# Patient Record
Sex: MALE | Race: WHITE | NOT HISPANIC OR LATINO | Employment: OTHER | URBAN - METROPOLITAN AREA
[De-identification: names, ages, dates, MRNs, and addresses within clinical notes are randomized per-mention and may not be internally consistent; named-entity substitution may affect disease eponyms.]

---

## 2017-08-08 ENCOUNTER — GENERIC CONVERSION - ENCOUNTER (OUTPATIENT)
Dept: OTHER | Facility: OTHER | Age: 82
End: 2017-08-08

## 2017-08-09 ENCOUNTER — ALLSCRIPTS OFFICE VISIT (OUTPATIENT)
Dept: OTHER | Facility: OTHER | Age: 82
End: 2017-08-09

## 2017-08-15 ENCOUNTER — GENERIC CONVERSION - ENCOUNTER (OUTPATIENT)
Dept: OTHER | Facility: OTHER | Age: 82
End: 2017-08-15

## 2017-08-18 ENCOUNTER — GENERIC CONVERSION - ENCOUNTER (OUTPATIENT)
Dept: OTHER | Facility: OTHER | Age: 82
End: 2017-08-18

## 2017-08-29 ENCOUNTER — GENERIC CONVERSION - ENCOUNTER (OUTPATIENT)
Dept: OTHER | Facility: OTHER | Age: 82
End: 2017-08-29

## 2017-10-02 ENCOUNTER — GENERIC CONVERSION - ENCOUNTER (OUTPATIENT)
Dept: OTHER | Facility: OTHER | Age: 82
End: 2017-10-02

## 2017-10-03 ENCOUNTER — TRANSCRIBE ORDERS (OUTPATIENT)
Dept: ADMINISTRATIVE | Facility: HOSPITAL | Age: 82
End: 2017-10-03

## 2017-10-03 ENCOUNTER — APPOINTMENT (OUTPATIENT)
Dept: LAB | Facility: HOSPITAL | Age: 82
End: 2017-10-03
Attending: UROLOGY
Payer: COMMERCIAL

## 2017-10-03 DIAGNOSIS — C61 MALIGNANT NEOPLASM OF PROSTATE (HCC): ICD-10-CM

## 2017-10-03 DIAGNOSIS — C61 MALIGNANT NEOPLASM OF PROSTATE (HCC): Primary | ICD-10-CM

## 2017-10-03 LAB — PSA SERPL-MCNC: <0.1 NG/ML (ref 0–4)

## 2017-10-03 PROCEDURE — G0103 PSA SCREENING: HCPCS

## 2017-10-03 PROCEDURE — 36415 COLL VENOUS BLD VENIPUNCTURE: CPT

## 2017-12-28 ENCOUNTER — ANESTHESIA EVENT (OUTPATIENT)
Dept: PERIOP | Facility: HOSPITAL | Age: 82
DRG: 494 | End: 2017-12-28
Payer: COMMERCIAL

## 2017-12-29 ENCOUNTER — GENERIC CONVERSION - ENCOUNTER (OUTPATIENT)
Dept: OTHER | Facility: OTHER | Age: 82
End: 2017-12-29

## 2017-12-29 ENCOUNTER — HOSPITAL ENCOUNTER (OUTPATIENT)
Dept: RADIOLOGY | Facility: HOSPITAL | Age: 82
Setting detail: OUTPATIENT SURGERY
Discharge: HOME | DRG: 494 | End: 2017-12-29
Payer: COMMERCIAL

## 2017-12-29 ENCOUNTER — HOSPITAL ENCOUNTER (INPATIENT)
Facility: HOSPITAL | Age: 82
LOS: 1 days | Discharge: HOME HEALTH | DRG: 494 | End: 2017-12-31
Attending: PODIATRIST | Admitting: PODIATRIST
Payer: COMMERCIAL

## 2017-12-29 ENCOUNTER — ANESTHESIA (OUTPATIENT)
Dept: PERIOP | Facility: HOSPITAL | Age: 82
DRG: 494 | End: 2017-12-29
Payer: COMMERCIAL

## 2017-12-29 ENCOUNTER — APPOINTMENT (OUTPATIENT)
Dept: RADIOLOGY | Facility: HOSPITAL | Age: 82
DRG: 494 | End: 2017-12-29
Payer: COMMERCIAL

## 2017-12-29 DIAGNOSIS — S82.841A CLOSED DISPLACED BIMALLEOLAR FRACTURE OF RIGHT LOWER LEG: ICD-10-CM

## 2017-12-29 DIAGNOSIS — R26.2 AMBULATORY DYSFUNCTION: ICD-10-CM

## 2017-12-29 DIAGNOSIS — I48.20 CHRONIC ATRIAL FIBRILLATION (HCC): Primary | ICD-10-CM

## 2017-12-29 PROCEDURE — C1713 ANCHOR/SCREW BN/BN,TIS/BN: HCPCS | Performed by: PODIATRIST

## 2017-12-29 PROCEDURE — 0QSG04Z REPOSITION RIGHT TIBIA WITH INTERNAL FIXATION DEVICE, OPEN APPROACH: ICD-10-PCS | Performed by: PODIATRIST

## 2017-12-29 PROCEDURE — 73600 X-RAY EXAM OF ANKLE: CPT

## 2017-12-29 PROCEDURE — C1769 GUIDE WIRE: HCPCS | Performed by: PODIATRIST

## 2017-12-29 PROCEDURE — 73610 X-RAY EXAM OF ANKLE: CPT

## 2017-12-29 DEVICE — IMPLANTABLE DEVICE: Type: IMPLANTABLE DEVICE | Site: FIBULA | Status: FUNCTIONAL

## 2017-12-29 DEVICE — IMPLANTABLE DEVICE: Type: IMPLANTABLE DEVICE | Site: ANKLE | Status: FUNCTIONAL

## 2017-12-29 DEVICE — SCREW CORT 3.5 X 18MM LOPRFL: Type: IMPLANTABLE DEVICE | Site: FIBULA | Status: FUNCTIONAL

## 2017-12-29 DEVICE — SCREW CORT 3.5 X 16MM LOPRFL: Type: IMPLANTABLE DEVICE | Site: FIBULA | Status: FUNCTIONAL

## 2017-12-29 DEVICE — SCREW CORT 2.7 X 14MM LCK LOPROFL: Type: IMPLANTABLE DEVICE | Site: FIBULA | Status: FUNCTIONAL

## 2017-12-29 DEVICE — SCREW CORT 3.5 X 14MM LCK LOPRFL: Type: IMPLANTABLE DEVICE | Site: FIBULA | Status: FUNCTIONAL

## 2017-12-29 DEVICE — SCREW CORT 2.7 X 12MM LCK LOPRRL: Type: IMPLANTABLE DEVICE | Site: FIBULA | Status: FUNCTIONAL

## 2017-12-29 DEVICE — SCREW CORT 3.5 X 16MM LCK LOPRFL: Type: IMPLANTABLE DEVICE | Site: FIBULA | Status: FUNCTIONAL

## 2017-12-29 RX ORDER — TRAMADOL HYDROCHLORIDE 50 MG/1
50 TABLET ORAL 2 TIMES DAILY PRN
COMMUNITY

## 2017-12-29 RX ORDER — LISINOPRIL 20 MG/1
40 TABLET ORAL DAILY
Status: DISCONTINUED | OUTPATIENT
Start: 2017-12-30 | End: 2017-12-31 | Stop reason: HOSPADM

## 2017-12-29 RX ORDER — MIDAZOLAM HYDROCHLORIDE 1 MG/ML
INJECTION INTRAMUSCULAR; INTRAVENOUS AS NEEDED
Status: DISCONTINUED | OUTPATIENT
Start: 2017-12-29 | End: 2017-12-29 | Stop reason: SURG

## 2017-12-29 RX ORDER — OXYBUTYNIN CHLORIDE 15 MG/1
15 TABLET, EXTENDED RELEASE ORAL DAILY
COMMUNITY
End: 2018-11-11 | Stop reason: SDUPTHER

## 2017-12-29 RX ORDER — AMOXICILLIN 500 MG/1
500 TABLET, FILM COATED ORAL DAILY PRN
COMMUNITY

## 2017-12-29 RX ORDER — ALBUTEROL SULFATE 90 UG/1
2 AEROSOL, METERED RESPIRATORY (INHALATION) EVERY 6 HOURS PRN
Status: DISCONTINUED | OUTPATIENT
Start: 2017-12-29 | End: 2017-12-31 | Stop reason: HOSPADM

## 2017-12-29 RX ORDER — DILTIAZEM HYDROCHLORIDE 120 MG/1
120 CAPSULE, COATED, EXTENDED RELEASE ORAL DAILY
Status: DISCONTINUED | OUTPATIENT
Start: 2017-12-30 | End: 2017-12-31 | Stop reason: HOSPADM

## 2017-12-29 RX ORDER — TRAMADOL HYDROCHLORIDE 50 MG/1
50 TABLET ORAL 2 TIMES DAILY PRN
Status: DISCONTINUED | OUTPATIENT
Start: 2017-12-29 | End: 2017-12-31 | Stop reason: HOSPADM

## 2017-12-29 RX ORDER — FUROSEMIDE 20 MG/1
20 TABLET ORAL DAILY
COMMUNITY

## 2017-12-29 RX ORDER — BUDESONIDE AND FORMOTEROL FUMARATE DIHYDRATE 160; 4.5 UG/1; UG/1
2 AEROSOL RESPIRATORY (INHALATION) DAILY
Status: DISCONTINUED | OUTPATIENT
Start: 2017-12-30 | End: 2017-12-31 | Stop reason: HOSPADM

## 2017-12-29 RX ORDER — MIRTAZAPINE 15 MG/1
15 TABLET, FILM COATED ORAL
Status: DISCONTINUED | OUTPATIENT
Start: 2017-12-29 | End: 2017-12-31 | Stop reason: HOSPADM

## 2017-12-29 RX ORDER — LISINOPRIL 40 MG/1
40 TABLET ORAL DAILY
COMMUNITY

## 2017-12-29 RX ORDER — ONDANSETRON 2 MG/ML
INJECTION INTRAMUSCULAR; INTRAVENOUS AS NEEDED
Status: DISCONTINUED | OUTPATIENT
Start: 2017-12-29 | End: 2017-12-29 | Stop reason: SURG

## 2017-12-29 RX ORDER — ALBUTEROL SULFATE 90 UG/1
2 AEROSOL, METERED RESPIRATORY (INHALATION) EVERY 6 HOURS PRN
COMMUNITY

## 2017-12-29 RX ORDER — ATORVASTATIN CALCIUM 40 MG/1
40 TABLET, FILM COATED ORAL DAILY
COMMUNITY

## 2017-12-29 RX ORDER — PROPOFOL 10 MG/ML
INJECTION, EMULSION INTRAVENOUS AS NEEDED
Status: DISCONTINUED | OUTPATIENT
Start: 2017-12-29 | End: 2017-12-29 | Stop reason: SURG

## 2017-12-29 RX ORDER — ATORVASTATIN CALCIUM 40 MG/1
40 TABLET, FILM COATED ORAL DAILY
Status: DISCONTINUED | OUTPATIENT
Start: 2017-12-30 | End: 2017-12-31 | Stop reason: HOSPADM

## 2017-12-29 RX ORDER — ATENOLOL 25 MG/1
50 TABLET ORAL DAILY
COMMUNITY

## 2017-12-29 RX ORDER — FUROSEMIDE 20 MG/1
20 TABLET ORAL DAILY
Status: DISCONTINUED | OUTPATIENT
Start: 2017-12-30 | End: 2017-12-31 | Stop reason: HOSPADM

## 2017-12-29 RX ORDER — SODIUM CHLORIDE 9 MG/ML
125 INJECTION, SOLUTION INTRAVENOUS CONTINUOUS
Status: CANCELLED | OUTPATIENT
Start: 2017-12-29

## 2017-12-29 RX ORDER — OXYBUTYNIN CHLORIDE 5 MG/1
15 TABLET, EXTENDED RELEASE ORAL DAILY
Status: DISCONTINUED | OUTPATIENT
Start: 2017-12-30 | End: 2017-12-31 | Stop reason: HOSPADM

## 2017-12-29 RX ORDER — OXYCODONE HYDROCHLORIDE AND ACETAMINOPHEN 5; 325 MG/1; MG/1
1 TABLET ORAL EVERY 4 HOURS PRN
Status: DISCONTINUED | OUTPATIENT
Start: 2017-12-29 | End: 2017-12-30

## 2017-12-29 RX ORDER — DABIGATRAN ETEXILATE 150 MG/1
150 CAPSULE, COATED PELLETS ORAL EVERY 12 HOURS SCHEDULED
Status: DISCONTINUED | OUTPATIENT
Start: 2017-12-29 | End: 2017-12-31 | Stop reason: HOSPADM

## 2017-12-29 RX ORDER — DONEPEZIL HYDROCHLORIDE 10 MG/1
10 TABLET, FILM COATED ORAL DAILY
COMMUNITY

## 2017-12-29 RX ORDER — MIRTAZAPINE 30 MG/1
30 TABLET, FILM COATED ORAL
COMMUNITY

## 2017-12-29 RX ORDER — ATENOLOL 25 MG/1
25 TABLET ORAL DAILY
Status: DISCONTINUED | OUTPATIENT
Start: 2017-12-30 | End: 2017-12-31 | Stop reason: HOSPADM

## 2017-12-29 RX ORDER — DONEPEZIL HYDROCHLORIDE 5 MG/1
10 TABLET, FILM COATED ORAL DAILY
Status: DISCONTINUED | OUTPATIENT
Start: 2017-12-30 | End: 2017-12-31 | Stop reason: HOSPADM

## 2017-12-29 RX ORDER — LIDOCAINE HYDROCHLORIDE 10 MG/ML
INJECTION, SOLUTION INFILTRATION; PERINEURAL AS NEEDED
Status: DISCONTINUED | OUTPATIENT
Start: 2017-12-29 | End: 2017-12-29 | Stop reason: SURG

## 2017-12-29 RX ORDER — HYDRALAZINE HYDROCHLORIDE 20 MG/ML
5 INJECTION INTRAMUSCULAR; INTRAVENOUS EVERY 6 HOURS PRN
Status: DISCONTINUED | OUTPATIENT
Start: 2017-12-29 | End: 2017-12-30

## 2017-12-29 RX ORDER — POTASSIUM CHLORIDE 750 MG/1
10 CAPSULE, EXTENDED RELEASE ORAL 2 TIMES DAILY
Status: DISCONTINUED | OUTPATIENT
Start: 2017-12-29 | End: 2017-12-29 | Stop reason: SDUPTHER

## 2017-12-29 RX ORDER — ROPIVACAINE HYDROCHLORIDE 5 MG/ML
INJECTION, SOLUTION EPIDURAL; INFILTRATION; PERINEURAL AS NEEDED
Status: DISCONTINUED | OUTPATIENT
Start: 2017-12-29 | End: 2017-12-29 | Stop reason: SURG

## 2017-12-29 RX ORDER — FENTANYL CITRATE 50 UG/ML
INJECTION, SOLUTION INTRAMUSCULAR; INTRAVENOUS AS NEEDED
Status: DISCONTINUED | OUTPATIENT
Start: 2017-12-29 | End: 2017-12-29 | Stop reason: SURG

## 2017-12-29 RX ORDER — POTASSIUM CHLORIDE 750 MG/1
10 TABLET, EXTENDED RELEASE ORAL 2 TIMES DAILY
Status: DISCONTINUED | OUTPATIENT
Start: 2017-12-29 | End: 2017-12-31 | Stop reason: HOSPADM

## 2017-12-29 RX ORDER — DILTIAZEM HYDROCHLORIDE 60 MG/1
120 TABLET, FILM COATED ORAL DAILY
Status: DISCONTINUED | OUTPATIENT
Start: 2017-12-30 | End: 2017-12-29 | Stop reason: CLARIF

## 2017-12-29 RX ORDER — MEPERIDINE HYDROCHLORIDE 50 MG/ML
12.5 INJECTION INTRAMUSCULAR; INTRAVENOUS; SUBCUTANEOUS ONCE AS NEEDED
Status: DISCONTINUED | OUTPATIENT
Start: 2017-12-29 | End: 2017-12-29 | Stop reason: HOSPADM

## 2017-12-29 RX ORDER — FENTANYL CITRATE/PF 50 MCG/ML
50 SYRINGE (ML) INJECTION
Status: DISCONTINUED | OUTPATIENT
Start: 2017-12-29 | End: 2017-12-29 | Stop reason: HOSPADM

## 2017-12-29 RX ORDER — SODIUM CHLORIDE 9 MG/ML
75 INJECTION, SOLUTION INTRAVENOUS CONTINUOUS
Status: DISCONTINUED | OUTPATIENT
Start: 2017-12-29 | End: 2017-12-31 | Stop reason: HOSPADM

## 2017-12-29 RX ORDER — DABIGATRAN ETEXILATE 150 MG/1
150 CAPSULE, COATED PELLETS ORAL 2 TIMES DAILY
COMMUNITY
End: 2019-02-14

## 2017-12-29 RX ORDER — BUDESONIDE AND FORMOTEROL FUMARATE DIHYDRATE 160; 4.5 UG/1; UG/1
2 AEROSOL RESPIRATORY (INHALATION) DAILY
COMMUNITY
End: 2018-09-14

## 2017-12-29 RX ORDER — ONDANSETRON 2 MG/ML
4 INJECTION INTRAMUSCULAR; INTRAVENOUS ONCE AS NEEDED
Status: DISCONTINUED | OUTPATIENT
Start: 2017-12-29 | End: 2017-12-29 | Stop reason: HOSPADM

## 2017-12-29 RX ORDER — MULTIVITAMIN
1 CAPSULE ORAL DAILY
COMMUNITY

## 2017-12-29 RX ORDER — POTASSIUM CHLORIDE 750 MG/1
10 CAPSULE, EXTENDED RELEASE ORAL 2 TIMES DAILY
COMMUNITY
End: 2022-02-09 | Stop reason: ALTCHOICE

## 2017-12-29 RX ORDER — DILTIAZEM HYDROCHLORIDE 120 MG/1
120 TABLET, FILM COATED ORAL DAILY
COMMUNITY
End: 2022-02-09 | Stop reason: ALTCHOICE

## 2017-12-29 RX ORDER — MAGNESIUM HYDROXIDE 1200 MG/15ML
LIQUID ORAL AS NEEDED
Status: DISCONTINUED | OUTPATIENT
Start: 2017-12-29 | End: 2017-12-29 | Stop reason: HOSPADM

## 2017-12-29 RX ORDER — BUPIVACAINE HYDROCHLORIDE 5 MG/ML
INJECTION, SOLUTION PERINEURAL AS NEEDED
Status: DISCONTINUED | OUTPATIENT
Start: 2017-12-29 | End: 2017-12-29 | Stop reason: HOSPADM

## 2017-12-29 RX ADMIN — DEXAMETHASONE SODIUM PHOSPHATE 4 MG: 10 INJECTION INTRAMUSCULAR; INTRAVENOUS at 15:53

## 2017-12-29 RX ADMIN — PROPOFOL 150 MG: 10 INJECTION, EMULSION INTRAVENOUS at 13:37

## 2017-12-29 RX ADMIN — HYDROMORPHONE HYDROCHLORIDE 0.5 MG: 1 INJECTION, SOLUTION INTRAMUSCULAR; INTRAVENOUS; SUBCUTANEOUS at 17:05

## 2017-12-29 RX ADMIN — MIDAZOLAM HYDROCHLORIDE 1 MG: 1 INJECTION, SOLUTION INTRAMUSCULAR; INTRAVENOUS at 13:09

## 2017-12-29 RX ADMIN — TRAMADOL HYDROCHLORIDE 50 MG: 50 TABLET, FILM COATED ORAL at 22:58

## 2017-12-29 RX ADMIN — ROPIVACAINE HYDROCHLORIDE 25 ML: 5 INJECTION, SOLUTION EPIDURAL; INFILTRATION; PERINEURAL at 13:17

## 2017-12-29 RX ADMIN — FENTANYL CITRATE 100 MCG: 50 INJECTION INTRAMUSCULAR; INTRAVENOUS at 13:09

## 2017-12-29 RX ADMIN — MIRTAZAPINE 15 MG: 15 TABLET, FILM COATED ORAL at 21:17

## 2017-12-29 RX ADMIN — SODIUM CHLORIDE 125 ML/HR: 0.9 INJECTION, SOLUTION INTRAVENOUS at 11:34

## 2017-12-29 RX ADMIN — FENTANYL CITRATE 50 MCG: 50 INJECTION INTRAMUSCULAR; INTRAVENOUS at 16:42

## 2017-12-29 RX ADMIN — SODIUM CHLORIDE 125 ML/HR: 0.9 INJECTION, SOLUTION INTRAVENOUS at 23:22

## 2017-12-29 RX ADMIN — LIDOCAINE HYDROCHLORIDE 60 MG: 10 INJECTION, SOLUTION INFILTRATION; PERINEURAL at 13:37

## 2017-12-29 RX ADMIN — HYDROMORPHONE HYDROCHLORIDE 0.5 MG: 1 INJECTION, SOLUTION INTRAMUSCULAR; INTRAVENOUS; SUBCUTANEOUS at 16:56

## 2017-12-29 RX ADMIN — ROPIVACAINE HYDROCHLORIDE 15 ML: 5 INJECTION, SOLUTION EPIDURAL; INFILTRATION; PERINEURAL at 13:22

## 2017-12-29 RX ADMIN — DABIGATRAN ETEXILATE MESYLATE 150 MG: 150 CAPSULE ORAL at 21:17

## 2017-12-29 RX ADMIN — POTASSIUM CHLORIDE 10 MEQ: 750 TABLET, EXTENDED RELEASE ORAL at 20:04

## 2017-12-29 RX ADMIN — MIDAZOLAM HYDROCHLORIDE 1 MG: 1 INJECTION, SOLUTION INTRAMUSCULAR; INTRAVENOUS at 13:55

## 2017-12-29 RX ADMIN — FENTANYL CITRATE 50 MCG: 50 INJECTION INTRAMUSCULAR; INTRAVENOUS at 16:37

## 2017-12-29 RX ADMIN — ONDANSETRON HYDROCHLORIDE 4 MG: 2 INJECTION, SOLUTION INTRAVENOUS at 15:53

## 2017-12-29 RX ADMIN — FENTANYL CITRATE 50 MCG: 50 INJECTION INTRAMUSCULAR; INTRAVENOUS at 15:05

## 2017-12-29 RX ADMIN — SODIUM CHLORIDE 125 ML/HR: 0.9 INJECTION, SOLUTION INTRAVENOUS at 16:16

## 2017-12-29 RX ADMIN — FENTANYL CITRATE 100 MCG: 50 INJECTION INTRAMUSCULAR; INTRAVENOUS at 13:59

## 2017-12-29 RX ADMIN — OXYCODONE HYDROCHLORIDE AND ACETAMINOPHEN 1 TABLET: 5; 325 TABLET ORAL at 21:18

## 2017-12-29 NOTE — H&P
Patient is an 80-year-old male status post ORIF bimalleolar fracture of the right ankle who was admitted postoperatively for pain control and ambulatory dysfunction  We will consult PT and OT for recommendations regarding his non weight-bearing status  We will also consult internal medicine as patient's wife is concerned with his history of atrial fibrillation and since he has been off blood thinners she is concerned that he will throw clots  We will restart his anticoagulation of Pradaxa tonight  Patient past medical history significant for rheumatoid arthritis, history of premature atrial contraction, atrial fibrillation hypertension, hyperlipidemia, history of CVA and COPD  The patient was medically cleared for surgery from primary care provider    Full H and P in patient's chart to be scanned please refer to this for complete history and physical

## 2017-12-29 NOTE — ANESTHESIA PREPROCEDURE EVALUATION
Review of Systems/Medical History  Patient summary reviewed  Chart reviewed  No history of anesthetic complications     Cardiovascular  Hyperlipidemia, Hypertension controlled, Dysrhythmias, atrial fibrillation,    Pulmonary  Smoker ex-smoker , COPD , Sleep apnea , ,        GI/Hepatic  Negative GI/hepatic ROS          Prostatic disorder, history of prostate cancer       Endo/Other  Arthritis     GYN       Hematology    Coagulation disorder (LD Pradaxa 2 days ago) currently taking oral anticoagulants,    Musculoskeletal  Rheumatoid arthritis Severity: moderate, Back pain , chronic back pain and lumbar pain,        Neurology    CVA ("Questionable") , no residual symptoms,   Comment: Mild dementia Psychology   Negative psychology ROS            Physical Exam    Airway    Mallampati score: II  TM Distance: >3 FB  Neck ROM: full     Dental   Comment: "Dental appliance" on bottom,     Cardiovascular  Rhythm: irregular, Rate: normal, Murmur,     Pulmonary  Pulmonary exam normal Breath sounds clear to auscultation,     Other Findings        Anesthesia Plan  ASA Score- 3     Anesthesia Type- general and regional with ASA Monitors  Additional Monitors:   Airway Plan: ETT  Comment: Popliteal /saphenous block preop  Plan Factors-Patient not instructed to abstain from smoking on day of procedure  Patient did not smoke on day of surgery  Induction- intravenous  Postoperative Plan- Plan for postoperative opioid use  Informed Consent- Anesthetic plan and risks discussed with patient and spouse

## 2017-12-29 NOTE — ANESTHESIA PROCEDURE NOTES
Peripheral Block    Patient location during procedure: OR  Start time: 12/29/2017 1:18 PM  End time: 12/29/2017 1:22 PM  Reason for block: at surgeon's request and post-op pain management  Staffing  Anesthesiologist: Arely Ibrahim  Performed: anesthesiologist   Preanesthetic Checklist  Completed: patient identified, site marked, surgical consent, pre-op evaluation, timeout performed, IV checked, risks and benefits discussed and monitors and equipment checked  Peripheral Block  Patient position: supine  Prep: ChloraPrep  Patient monitoring: heart rate, continuous pulse ox and frequent blood pressure checks  Block type: adductor canal block  Laterality: right  Injection technique: single-shot  Procedures: ultrasound guided  ultrasound permanent image saved    Local infiltration: ropivacaine  Infiltration strength: 0 5 %  Dose: 15 mL  Needle  Needle type: short-bevel   Needle length: 10 cm  Needle localization: ultrasound guidance  Test dose: negative  Assessment  Injection assessment: incremental injection, local visualized surrounding nerve on ultrasound, negative aspiration for heme and no paresthesia on injection  patient tolerated the procedure well with no immediate complications

## 2017-12-29 NOTE — ANESTHESIA PROCEDURE NOTES
Peripheral Block    Patient location during procedure: OR  Start time: 12/29/2017 1:09 PM  End time: 12/29/2017 1:17 PM  Reason for block: at surgeon's request and post-op pain management  Staffing  Anesthesiologist: Placido Harper  Performed: anesthesiologist   Preanesthetic Checklist  Completed: patient identified, site marked, surgical consent, pre-op evaluation, timeout performed, IV checked, risks and benefits discussed and monitors and equipment checked  Peripheral Block  Patient position: left lateral decubitus  Prep: ChloraPrep  Patient monitoring: heart rate, continuous pulse ox and frequent blood pressure checks  Block type: popliteal  Laterality: right  Injection technique: single-shot  Procedures: ultrasound guided and nerve stimulator  ultrasound permanent image saved    Local infiltration: ropivacaine  Infiltration strength: 0 5 %  Dose: 25 mL  Needle  Needle type: short-bevel   Needle gauge: 22 G  Needle length: 10 cm  Needle localization: ultrasound guidance  Assessment  Injection assessment: incremental injection, local visualized surrounding nerve on ultrasound and negative aspiration for heme  Post-procedure:  site cleaned  patient tolerated the procedure well with no immediate complications

## 2017-12-29 NOTE — OP NOTE
OPERATIVE REPORT  PATIENT NAME: Masha Mantilla    :  1935  MRN: [id number]  Pt Location: [facility] OR ROOM 01    SURGERY DATE: 2017    Surgeon(s) and Role:     * Roselia Washington DPM - Primary     * Chaim Montanez - Assisting    Preop Diagnosis:  Closed displaced bimalleolar fracture of right lower leg [S82 841A]    Post-Op Diagnosis Codes:     * Closed displaced bimalleolar fracture of right lower leg [S82 841A]    Procedure(s) (LRB):  FIBULA, MEDIAL MALLEOLUS FRACTUE OPEN REDUCTION W/ INTERNAL FIXATION; USE OF PLATES AND SCREWS (Right)    Specimen(s):  * No specimens in log *    Estimated Blood Loss:   Minimal    Drains:  Urethral Catheter Latex 16 Fr  (Active)   Site Assessment Clean;Skin intact 2017  2:11 PM   Collection Container Leg bag 2017  2:11 PM   Securement Method Leg strap 2017  2:11 PM   Output (mL) 150 mL 2017  2:11 PM   Number of days: 0       Anesthesia Type:   General w/ Popliteal Block    Hemostasis:  Pneumatic thigh tourniquet at 300 mm of mercury    Operative Indications:  Closed displaced bimalleolar fracture of right lower leg [S82 841A]    Materials  Arthrex locking distal fibular plate with 2 7 and 3 5 locking screws  One 3 5 x 18 mm nonlocking fully-threaded lag screw  Arthrex locking medial hook plate with 2 7 and 3 5 locking and nonlocking screw  Arthrex 4 0 cannulated partially threaded screw  Vicryl, Monocryl, surgical staples    Injectables:  10 cc of 0 5% Marcaine plain      Operative Findings:  Bimalleolar fracture with displaced medial malleolar fracture and fibular fragment    Complications:   None    Procedure and Technique:  Under mild sedation, the patient was brought into the operating room and placed on the operating room table in the supine position  A pneumatic thigh tourniquet was then placed around the patient's right thigh with ample webril padding   A time out was performed to confirm the correct patient, procedure and site with all parties in agreement  Preoperatively, patient received a popliteal block of the right lower extremity  Following general sedation, the foot was then scrubbed, prepped and draped in the usual aseptic manner  An esmarch bandage was utilized to exsangunate the patients foot and the pneumatic thigh tourniquet was then inflated  The esmarch bandage was removed and the foot was placed on the operating room table  Attention was then directed to the lateral aspect of the patient's right leg where approximately a 7 cm to 8 cm linear incision was made overlying the patient's fibula  The incision was deepened down in a layered fashion through skin and subcutaneous layers, superficial and deep fascia and periosteum utilizing a combination of sharp and blunt dissection  Care was taken to retract all vital neurovascular structures  All bleeders were cauterized and ligated as necessary  Once down to the level of the periosteum, an incision was then made overlying the periosteum of the fibula and the periosteum was reflected off the fibula utilizing a key elevator  Once this was accomplished, the oblique fracture of the fibula was visualized and consistent with the previous x-rays  Utilizing a curette and irrigation with normal sterile saline, the fracture site was debrided of any fibrous clot that had formed or small bone fragments that were interposed between the main fibula pieces  Once the fracture site was essentially cleared out, the fracture was temporarily reduced utilizing bone reduction forceps  At this point, clinically, the fracture was reduced and compressed  Intraoperative fluoroscopy was also used to visualize the fibula and excellent alignment of the fracture site with restoration of the length of the fibula and reduction of any angulation or rotation was noted       At this point, a 3 5 mm fully threaded cortical screw was placed from anterior to posterior at the angle of 45 degrees to the fracture site through the fracture line  This was done utilizing standard AO lag screw technique and the screw was tightened down  Excellent compression was noted at the fracture site and the alignment and length of the fibula was noted to still be in excellent position and verified utilizing the intraoperative fluoroscopy  At this time a 7 hole plate 1/3 tubular plate was placed overlying the lateral aspect of the fibula and secured using combination of 3 5 and 2 7 locking screws  Care was taken to contour the plate to the natural contour of the fibula and also to direct the distal cancellous screws in a slight proximal angulation to avoid entering the ankle joint  Intraoperative fluoroscopy was used throughout this process and was checked again after all screws and plate was applied  Screws were deemed proper length and plate was fairly adhered to the fibula  At this time a hook was then applied to the distal fibula and using live fluoroscopy the syndesmosis was stressed  This was negative for any syndesmotic instability and therefore no syndesmotic fixation was necessary  The area was then copiously flushed with normal sterile saline and closed in a layered fashion utilizing 2-0 Vicryl, 3-0 monocryl  and stainless steel surgical staples  Attention was then directed to the medial aspect of the patient's ankle where previous x-ray analysis showed a transverse avulsion fracture of the medial malleolus  Incision was then made approximately 4-5 cm in length linear extending from superior to inferior overlying the midline of the medial aspect of the distal tibia, extending from just distal to the medial malleolus and proximally from there  This incision was deepened down in a layered fashion  Care was taken to retract all vital neurovascular structures  All bleeders were cauterized or ligated as necessary  Upon visualization of the medial aspect of the patient's ankle, the fracture as noted in the x-ray was visualized intraoperatively  The fracture site of the medial malleolus was then curetted of any blood or fibrous clot that had formed since the fracture to allow for  interposition of the fracture fragments  Once the curetting was finished, the fracture fragments were realigned utilizing bone reduction forceps  Excellent alignment and compression of the fracture site was noted both clinically and also utilizing the intraoperative fluoroscopy  Attention was specifically noted to the ankle joint where no intra-articular stepoff was noted  Next, utilizing a K-wire , a 8 191 K-wires was driven from distal to proximal slight oblique angle, anterior and posterior and parallel to each other up from the distal medial malleolus into the mid medullary canal of the distal shaft of the tibia  Intraoperative fluoroscopy was used in multiple planes and noted adequate position of these K-wires  Next, having the periosteum reflected off the distal medial aspect of the patient's tibia proximal to the fracture site, an Arthrex locking medial hook plate was applied to the area and secured with one 4 0 partially threaded cannulated screw from the distal aspect and a combination of 2 7 and 3 5 mm locking and non-locking screws  Screw and plate fixation was visualized using C-arm fluoroscopy and noted to be in adequate alignment  Once all the fixation was achieved, the wound was then copiously irrigated with normal sterile saline and closed in layered fashion utilizing 2-0 Vicryl, 3-0 monocryl and surgical stainless steel staples  Final intraoperative fluoroscopy was then undertaken and excellent alignment of the ankle joint with compression of both medial and lateral fracture sites and alignment of the fracture fragment was noted  A postop injection consisting of 10 cc of 0 5% Marcaine plain was then injected  Dressings were then applied consisting of xeroform, 4 x 4s,  And Kerlix    The tourniquet was deflated and prompt capillary response was noted to the lower extremity  Next, a multi-layer dressing consisting of cast padding and Ace bandage were placed about to the patient's leg distal to the patient's knee  Next a below-knee fiberglass cast was then placed with the ankle in neutral alignment  The patient tolerated the procedure and anesthesia well and was then transferred to PACU with vital signs stable              Patient Disposition:  PACU     SIGNATURE: Michael Cisse  DATE: December 29, 2017  TIME: 4:12 PM

## 2017-12-30 PROBLEM — R26.2 AMBULATORY DYSFUNCTION: Status: ACTIVE | Noted: 2017-12-30

## 2017-12-30 LAB
APAP SERPL-MCNC: <2 UG/ML (ref 10–30)
HCT VFR BLD AUTO: 41.2 % (ref 36.5–49.3)
HGB BLD-MCNC: 13.5 G/DL (ref 12–17)

## 2017-12-30 PROCEDURE — G0480 DRUG TEST DEF 1-7 CLASSES: HCPCS | Performed by: HOSPITALIST

## 2017-12-30 PROCEDURE — 85014 HEMATOCRIT: CPT | Performed by: HOSPITALIST

## 2017-12-30 PROCEDURE — 94660 CPAP INITIATION&MGMT: CPT

## 2017-12-30 PROCEDURE — 85018 HEMOGLOBIN: CPT | Performed by: HOSPITALIST

## 2017-12-30 PROCEDURE — 80329 ANALGESICS NON-OPIOID 1 OR 2: CPT | Performed by: HOSPITALIST

## 2017-12-30 RX ORDER — OXYCODONE HYDROCHLORIDE 10 MG/1
10 TABLET ORAL EVERY 6 HOURS PRN
Status: DISCONTINUED | OUTPATIENT
Start: 2017-12-30 | End: 2017-12-31 | Stop reason: HOSPADM

## 2017-12-30 RX ORDER — OXYCODONE HYDROCHLORIDE AND ACETAMINOPHEN 5; 325 MG/1; MG/1
1 TABLET ORAL EVERY 4 HOURS PRN
Qty: 30 TABLET | Refills: 0 | Status: SHIPPED | OUTPATIENT
Start: 2017-12-30 | End: 2018-01-09

## 2017-12-30 RX ORDER — HYDRALAZINE HYDROCHLORIDE 20 MG/ML
10 INJECTION INTRAMUSCULAR; INTRAVENOUS EVERY 6 HOURS PRN
Status: DISCONTINUED | OUTPATIENT
Start: 2017-12-30 | End: 2017-12-31 | Stop reason: HOSPADM

## 2017-12-30 RX ORDER — OXYCODONE HYDROCHLORIDE 5 MG/1
5 TABLET ORAL EVERY 4 HOURS PRN
Status: DISCONTINUED | OUTPATIENT
Start: 2017-12-30 | End: 2017-12-31 | Stop reason: HOSPADM

## 2017-12-30 RX ADMIN — DILTIAZEM HYDROCHLORIDE 120 MG: 120 CAPSULE, COATED, EXTENDED RELEASE ORAL at 09:06

## 2017-12-30 RX ADMIN — SODIUM CHLORIDE 125 ML/HR: 0.9 INJECTION, SOLUTION INTRAVENOUS at 06:08

## 2017-12-30 RX ADMIN — ATENOLOL 25 MG: 25 TABLET ORAL at 09:06

## 2017-12-30 RX ADMIN — OXYCODONE HYDROCHLORIDE AND ACETAMINOPHEN 1 TABLET: 5; 325 TABLET ORAL at 15:33

## 2017-12-30 RX ADMIN — LISINOPRIL 40 MG: 20 TABLET ORAL at 09:05

## 2017-12-30 RX ADMIN — POTASSIUM CHLORIDE 10 MEQ: 750 TABLET, EXTENDED RELEASE ORAL at 18:02

## 2017-12-30 RX ADMIN — DABIGATRAN ETEXILATE MESYLATE 150 MG: 150 CAPSULE ORAL at 21:15

## 2017-12-30 RX ADMIN — HYDRALAZINE HYDROCHLORIDE 5 MG: 20 INJECTION INTRAMUSCULAR; INTRAVENOUS at 15:41

## 2017-12-30 RX ADMIN — OXYCODONE HYDROCHLORIDE AND ACETAMINOPHEN 1 TABLET: 5; 325 TABLET ORAL at 08:03

## 2017-12-30 RX ADMIN — FUROSEMIDE 20 MG: 20 TABLET ORAL at 09:06

## 2017-12-30 RX ADMIN — OXYBUTYNIN CHLORIDE 15 MG: 5 TABLET, FILM COATED, EXTENDED RELEASE ORAL at 09:05

## 2017-12-30 RX ADMIN — ATORVASTATIN CALCIUM 40 MG: 40 TABLET, FILM COATED ORAL at 09:06

## 2017-12-30 RX ADMIN — MIRTAZAPINE 15 MG: 15 TABLET, FILM COATED ORAL at 21:15

## 2017-12-30 RX ADMIN — OXYCODONE HYDROCHLORIDE AND ACETAMINOPHEN 1 TABLET: 5; 325 TABLET ORAL at 02:27

## 2017-12-30 RX ADMIN — HYDRALAZINE HYDROCHLORIDE 10 MG: 20 INJECTION INTRAMUSCULAR; INTRAVENOUS at 23:50

## 2017-12-30 RX ADMIN — BUDESONIDE AND FORMOTEROL FUMARATE DIHYDRATE 2 PUFF: 160; 4.5 AEROSOL RESPIRATORY (INHALATION) at 09:07

## 2017-12-30 RX ADMIN — OXYCODONE HYDROCHLORIDE 10 MG: 10 TABLET ORAL at 20:29

## 2017-12-30 RX ADMIN — SODIUM CHLORIDE 125 ML/HR: 0.9 INJECTION, SOLUTION INTRAVENOUS at 13:57

## 2017-12-30 RX ADMIN — POTASSIUM CHLORIDE 10 MEQ: 750 TABLET, EXTENDED RELEASE ORAL at 09:05

## 2017-12-30 RX ADMIN — TRAMADOL HYDROCHLORIDE 50 MG: 50 TABLET, FILM COATED ORAL at 13:55

## 2017-12-30 RX ADMIN — HYDRALAZINE HYDROCHLORIDE 5 MG: 20 INJECTION INTRAMUSCULAR; INTRAVENOUS at 08:04

## 2017-12-30 RX ADMIN — DONEPEZIL HYDROCHLORIDE 10 MG: 5 TABLET, FILM COATED ORAL at 09:05

## 2017-12-30 RX ADMIN — DABIGATRAN ETEXILATE MESYLATE 150 MG: 150 CAPSULE ORAL at 09:05

## 2017-12-30 NOTE — DISCHARGE SUMMARY
Discharge Summary - Charline Sullivan 80 y o  male MRN: [id number]    Unit/Bed#: E5 -01 Encounter: [id number]    Admission Date: 12/29/2017     Admitting Diagnosis: Closed displaced bimalleolar fracture of right lower leg [S82 841A]    HPI: Patient is an 68-year-old male status post ORIF bimalleolar fracture of the right ankle who was admitted postoperatively for pain control and ambulatory dysfunction  We will consult PT and OT for recommendations regarding his non weight-bearing status  We will also consult internal medicine as patient's wife is concerned with his history of atrial fibrillation and since he has been off blood thinners she is concerned that he will throw clots  We will restart his anticoagulation of Pradaxa tonight      Patient past medical history significant for rheumatoid arthritis, history of premature atrial contraction, atrial fibrillation hypertension, hyperlipidemia, history of CVA and COPD  Procedures Performed: No orders of the defined types were placed in this encounter  Hospital Course: Charline Sullivan is a 80year old male who was admitted under observation for ambulatory dysfunction s/p ORIF for right bimalleolar ankle fracture  Patient was evaluated by PT and Internal Medicine, where internal medicine recommended staying in-patient for one more night to monitor patient's fever and high blood pressure  Pt was discharged upon being cleared by internal medicine and physical therapy  Patient's neurovascular status and gross motor function was within baseline as patient had brisk cap refill time to right toes and was able to wiggle toes without difficulty or pain   Pt is to be discharged nonweightbearing to right lower extremity with knee roller/walker in below knee fiberglass cast  Patient will be discharged home with home PT    Significant Findings, Care, Treatment and Services Provided:   Post-operative xrays: expected post-operative changes s/p ORIF for bimalleolar ankle fracture  Consultations: PT and Internal Medicine     Complications: none    Discharge Diagnosis: s/p ORIF of right ankle bimalleolar fracture     Condition at Discharge: good     Discharge instructions/Information to patient and family:   See after visit summary for information provided to patient and family  Provisions for Follow-Up Care:  See after visit summary for information related to follow-up care and any pertinent home health orders  Disposition: Home with home PT    Planned Readmission: No    Discharge Statement   I spent 30 minutes discharging the patient  This time was spent on the day of discharge  I had direct contact with the patient on the day of discharge  Additional documentation is required if more than 30 minutes were spent on discharge  Discharge Medications:  See after visit summary for reconciled discharge medications provided to patient and family

## 2017-12-30 NOTE — DISCHARGE INSTRUCTIONS
Dr [facility]  1)  Cold Pack: Apply a cold pack for 45 - Minutes intervals just above the surgical site for the initial 24-48 hours  Never place on the toes  If a cast has been applied  Apply the cold pack to the thigh or knee area  2)  Elevation and Rest: Keep the foot elevated at least as high as the hips for the first 24-48 hours  It would be beneficial for the foot to be elevated when you are sitting during the first 7-10 days  Elevating the foot above the heart level will help control post operative pain and swelling  3)  Medication: Take prescription as prescribed by your physician  If you have any difficulty or side effects with the medication, stop taking immediately and notify your physician at once  Medications given today:     Percocet    3a  if applicable you may be given one of the following for prevention of blood clots  O Continue with  Pradaxa    Protection of Surgical Site/ Assistive Devices:  a) You will be given one of the following:  O below knee cast and nonweightbearing in knee roller  4) DO NOT GET THE BADAGE WET UNTIL THE DOCTOR GIVES PERMISSION  Keep the bandage clean, dry and intact until your follow-up appointment  General Information  1)  Bleeding: some bleeding through the bandage is normal  If bleeding persists, you may attempt to reinforce the existing bandage while following the above instruction  If bleeding persists, notify the physician  2)  Temperature: if your temperature rises [id number] 5 degree, call the office   3)  Problem: If you notice increasing swelling and/ or pain 2 to 3 days following surgery , please notify  4)  Pain: Within the first 24 hours following surgery, if your pain is not controlled sufficiently with pain medication, please check that your bandage is not too tight  You may loosen the badge without removing it  Wait 30 minutes, if your pain is not relieved     Please call the office

## 2017-12-30 NOTE — PROGRESS NOTES
During assessment, asked pt to wiggle toes  Pt stated that he is unable to wiggle them now  As per pt and previous nurse, pt was able to wiggle toes previously  Paged podiatrist on call  Podiatrist stated to monitor for increased swelling and increased pain  No increase in swelling noted and pt denied increase in pain

## 2017-12-30 NOTE — CONSULTS
Consulted for Medical Management by:  Dr Leanna Castillo      Assessment/Plan     1-POD#1-ORIF for bimalleolar fracture of the right ankle  Patient complaining of pain over the right ankle and asking for increase in his pain medications  2-low-grade fever likely postop secondary to surgery will continue to monitor  Continue Tylenol    3-atrial fibrillation  Rate controlled patient's Pradaxa has been started from last night  Continue 150 mg twice a day  Continue atenolol 25 mg daily    4-essential hypertension-currently uncontrolled secondary to pain will continue him on his lisinopril 40 mg daily, atenolol 25 mg daily and on hydralazine 10 mg q 6 hours p r n  for SBP more than 160     5-COPD without exacerbation  Continue Symbicort and albuterol p r n  Lisseth Washington 6-impaired fasting glucose-currently stable  His glycosylated hemoglobin has remained stable  Will continue to monitor and add a sliding scale coverage if it is elevated beyond 150     7-mild cognitive decline  Likely secondary to vascular dementia  --this has been stable over the past 6 months  Would recommend that the patient stay tonight for monitoring of his fever as well as blood pressures status if stable could be discharged tomorrow  History of Present Illness     HPI:  Jarrell Collet is a 80 y o  male who underwent ORIF of a bimalleolar fracture of the right ankle on 12/29/2017  Postoperatively he was admitted form and pain control as well as blood pressure control  His past medical history includes hypertension, dyslipidemia, COPD, cognitive decline, impaired fasting glucose The patient has been on Pradaxa for atrial fibrillation and this was held preoperatively  This was appropriately started 12 hours after surgery  His heart rate is between 82 and 90  The patient is running a low-grade fever and complains of pain over the leg  His blood pressure is elevated secondary to pain  He denies any chest pain palpitations or shortness of breath  Although there is a mention of rheumatoid arthritis in his past medical history is wife denies that the patient had any rheumatoid arthritis in the past   June Plume was consulted for medical management  He had an echocardiogram in September which revealed normal LV function, biatrial enlargement, mild aortic stenosis mild AI and mild MR  Unchanged from prior echo  His COPD has been stable and he uses Symbicort q h s  he uses albuterol MDI ray early  He was felt to have vascular dementia-by neurologist Dr Gwyn Ramirez  The patient continued on added 7  His memory has been stable over the past 6 months  He was cleared for surgery by his primary care physician  Historical Information   Past Medical History:   Diagnosis Date    [facility]fib [facility] [address] )     [facility]     CPAP (continuous positive airway pressure) dependence     CVA (cerebral vascular accident) ([facility] [address] )     possible but unsure when it was, was seen on MRI    Hyperlipidemia     Hypertension     PAC (premature atrial contraction)          Sleep apnea      There is no problem list on file for this patient  Past Surgical History:   Procedure Laterality Date    FRACTURE SURGERY      left femur    JOINT REPLACEMENT      shoulder left    PROSTATECTOMY         Social History   History   Alcohol Use No     History   Drug Use No     History   Smoking Status    Former Smoker   Smokeless Tobacco    Never Used     Comment: stopped 30 years ago        Family History:  Disease   Positive for hypertension and coronary artery  Meds/Allergies       Current Facility-Administered Medications:     albuterol (PROVENTIL HFA,VENTOLIN HFA) inhaler 2 puff, 2 puff, Inhalation, Q6H PRN, Zoë Scruggs    atenolol (TENORMIN) tablet 25 mg, 25 mg, Oral, Daily, Zoë Scruggs, 25 mg at 12/30/17 0906    atorvastatin (LIPITOR) tablet 40 mg, 40 mg, Oral, Daily, Zoë Scruggs, 40 mg at 12/30/17 0906    budesonide-formoterol (SYMBICORT) 160-4 5 mcg/act inhaler 2 puff, 2 puff, Inhalation, Daily, Trini Bottom, 2 puff at 12/30/17 0907    dabigatran etexilate (PRADAXA) capsule 150 mg, 150 mg, Oral, Q12H [address], Zoë Grahn, 150 mg at 12/30/17 0905    diltiazem (CARDIZEM CD) 24 hr capsule 120 mg, 120 mg, Oral, Daily, Zoë Grahn, 120 mg at 12/30/17 0906    donepezil (ARICEPT) tablet 10 mg, 10 mg, Oral, Daily, Zoë Grahn, 10 mg at 12/30/17 0905    furosemide (LASIX) tablet 20 mg, 20 mg, Oral, Daily, Zoë Grahn, 20 mg at 12/30/17 0906    hydrALAZINE (APRESOLINE) injection 5 mg, 5 mg, Intravenous, Q6H PRN, Valerie Hinojosa, DPM, 5 mg at 12/30/17 1541    lisinopril (ZESTRIL) tablet 40 mg, 40 mg, Oral, Daily, Zoë Grahn, 40 mg at 12/30/17 0905    mirtazapine (REMERON) tablet 15 mg, 15 mg, Oral, HS, Zoë Grahn, 15 mg at 12/29/17 2117    oxybutynin (DITROPAN-XL) 24 hr tablet 15 mg, 15 mg, Oral, Daily, Zoë Grahn, 15 mg at 12/30/17 0905    oxyCODONE-acetaminophen (PERCOCET) 5-325 mg per tablet 1 tablet, 1 tablet, Oral, Q4H PRN, Trini Bottom, 1 tablet at 12/30/17 1533    potassium chloride (K-DUR,KLOR-CON) CR tablet 10 mEq, 10 mEq, Oral, BID, Zoë Grahn, 10 mEq at 12/30/17 0905    sodium chloride 0 9 % infusion, 125 mL/hr, Intravenous, Continuous, Preston Galeano DO, Last Rate: 125 mL/hr at 12/30/17 1357, 125 mL/hr at 12/30/17 1357    traMADol (ULTRAM) tablet 50 mg, 50 mg, Oral, BID PRN, Trini Bottom, 50 mg at 12/30/17 1355    No Known Allergies    Review of Systems  A detailed 12 point review of systems was conducted and is negative apart from those mentioned in the HPI  Objective   Vitals: Blood pressure (!) 174/100, pulse 93, temperature 100 °F (37 8 °C), temperature source Temporal, resp  rate 19, height 5' 10" (1 778 m), weight 90 7 kg (200 lb), SpO2 94 %  Physical Exam   HEENT: PERRLA, EOMI, sclera anicteric, dry mucous membranes, tongue mucosa dry without lesions  Neck: supple, no JVD, lymphadenopathy, thyromegaly    Heart: Regular rate and rhythm, S1S2 present  No murmur, rub or gallop  Lungs; Clear to auscultation bilaterally  No wheezing, crackles or rhonchi  No accessory muscle use or respiratory distress  Abdomen: soft, non-tender, non-distended, NABS  No guarding or rebound  No peritoneal sound or mass  Extremities:  Leg in castNeurologic:  Cranial nerves II-XII intact  Strength and sensation globally intact  Speech fluent and goal directed  Awake, alert and oriented x 3  Skin: warm and dry  No petechiae, purpura or rash  Lab Results:   None available currently they will be reviewed once available  EKG-AFib with rapid ventricular rate  Code Status:  Full code    Counseling / Coordination of Care  Total floor / unit time spent today 22 minutes  Greater than 50% of total time was spent with the patient and / or family counseling and / or coordination of care

## 2017-12-30 NOTE — PROGRESS NOTES
Progress Note - Podiatry  Ellen Kinsey 80 y o  male MRN: [id number]  Unit/Bed#: E5 -01 Encounter: [id number]    Assessment:  3  79 y/o male pt with ambulatory dysfunction s/p ORIF bimalleolar fracture of right ankle  2  RA  3  H/o premature atrial contraction  4  afib  5  HTN  6  HLD  7  H/o CVA  8  COPD    Plan:  -Pt seen and examined at bedside  -Pt currently in pain in his RLE and has asked nurse for pain meds  Per nurse, percocet has helped with the pain in the morning  - pt is able to wiggle toes  CFT <3 secs to all digits of right foot  - postop xrays reviewed: read pending but post-operative changes s/p ORIF  - PT/OT evaluation pending  - Internal medicine consult pending, appreciate input  - pt to continue home meds      >dispo: discharge hopefully later today pending PT/OT recs and Internal Medicine evaluation    Subjective/Objective   Chief Complaint: No chief complaint on file  Subjective: 80 y o   male was seen and evaluated at bedside  Pt is laying comfortably in his bed with right leg elevated on pillow  Pt currently complains of pain in his RLE  Blood pressure (!) 182/102, pulse 95, temperature 99 1 °F (37 3 °C), temperature source Tympanic, resp  rate 18, height 5' 10" (1 778 m), weight 90 7 kg (200 lb), SpO2 95 %  ,Body mass index is 28 7 kg/m²  Invasive Devices     Peripheral Intravenous Line            Peripheral IV 12/29/17 Left Hand 1 day          Drain            Urethral Catheter Latex 16 Fr  1 day                Physical Exam:   General: Alert, cooperative and no distress  Lungs: Non labored breathing  Heart: Positive S1, S2  Abdomen: Soft, non-tender  Extremity: MSK function grossly intact, patient able to wiggle toes  CFT to right digits <3 secs  BK fiberglass cast to right foot intact        Lab, Imaging and other studies:   I have personally reviewed pertinent lab results        Imaging: I have personally reviewed pertinent films in PACS  EKG, Pathology, and Other Studies: I have personally reviewed pertinent reports    VTE Pharmacologic Prophylaxis: pradaxa

## 2017-12-31 VITALS
BODY MASS INDEX: 28.63 KG/M2 | SYSTOLIC BLOOD PRESSURE: 125 MMHG | DIASTOLIC BLOOD PRESSURE: 65 MMHG | TEMPERATURE: 99.1 F | RESPIRATION RATE: 18 BRPM | WEIGHT: 200 LBS | OXYGEN SATURATION: 95 % | HEART RATE: 116 BPM | HEIGHT: 70 IN

## 2017-12-31 LAB
ANION GAP SERPL CALCULATED.3IONS-SCNC: 8 MMOL/L (ref 4–13)
BUN SERPL-MCNC: 13 MG/DL (ref 5–25)
CALCIUM SERPL-MCNC: 9.8 MG/DL (ref 8.3–10.1)
CHLORIDE SERPL-SCNC: 110 MMOL/L (ref 100–108)
CO2 SERPL-SCNC: 24 MMOL/L (ref 21–32)
CREAT SERPL-MCNC: 0.95 MG/DL (ref 0.6–1.3)
ERYTHROCYTE [DISTWIDTH] IN BLOOD BY AUTOMATED COUNT: 15.1 % (ref 11.6–15.1)
GFR SERPL CREATININE-BSD FRML MDRD: 74 ML/MIN/1.73SQ M
GLUCOSE SERPL-MCNC: 120 MG/DL (ref 65–140)
HCT VFR BLD AUTO: 41.8 % (ref 36.5–49.3)
HGB BLD-MCNC: 13.6 G/DL (ref 12–17)
MCH RBC QN AUTO: 30.4 PG (ref 26.8–34.3)
MCHC RBC AUTO-ENTMCNC: 32.5 G/DL (ref 31.4–37.4)
MCV RBC AUTO: 94 FL (ref 82–98)
PLATELET # BLD AUTO: 237 THOUSANDS/UL (ref 149–390)
PMV BLD AUTO: 10.1 FL (ref 8.9–12.7)
POTASSIUM SERPL-SCNC: 4 MMOL/L (ref 3.5–5.3)
RBC # BLD AUTO: 4.47 MILLION/UL (ref 3.88–5.62)
SODIUM SERPL-SCNC: 142 MMOL/L (ref 136–145)
WBC # BLD AUTO: 13.11 THOUSAND/UL (ref 4.31–10.16)

## 2017-12-31 PROCEDURE — 85027 COMPLETE CBC AUTOMATED: CPT | Performed by: STUDENT IN AN ORGANIZED HEALTH CARE EDUCATION/TRAINING PROGRAM

## 2017-12-31 PROCEDURE — 94660 CPAP INITIATION&MGMT: CPT

## 2017-12-31 PROCEDURE — 80048 BASIC METABOLIC PNL TOTAL CA: CPT | Performed by: HOSPITALIST

## 2017-12-31 PROCEDURE — G8978 MOBILITY CURRENT STATUS: HCPCS

## 2017-12-31 PROCEDURE — 97116 GAIT TRAINING THERAPY: CPT

## 2017-12-31 PROCEDURE — 97162 PT EVAL MOD COMPLEX 30 MIN: CPT

## 2017-12-31 PROCEDURE — G8979 MOBILITY GOAL STATUS: HCPCS

## 2017-12-31 PROCEDURE — G8980 MOBILITY D/C STATUS: HCPCS

## 2017-12-31 PROCEDURE — 94760 N-INVAS EAR/PLS OXIMETRY 1: CPT

## 2017-12-31 RX ADMIN — ATENOLOL 25 MG: 25 TABLET ORAL at 11:10

## 2017-12-31 RX ADMIN — DONEPEZIL HYDROCHLORIDE 10 MG: 5 TABLET, FILM COATED ORAL at 08:56

## 2017-12-31 RX ADMIN — ATORVASTATIN CALCIUM 40 MG: 40 TABLET, FILM COATED ORAL at 08:55

## 2017-12-31 RX ADMIN — DABIGATRAN ETEXILATE MESYLATE 150 MG: 150 CAPSULE ORAL at 08:55

## 2017-12-31 RX ADMIN — FUROSEMIDE 20 MG: 20 TABLET ORAL at 08:55

## 2017-12-31 RX ADMIN — OXYBUTYNIN CHLORIDE 15 MG: 5 TABLET, FILM COATED, EXTENDED RELEASE ORAL at 08:55

## 2017-12-31 RX ADMIN — DILTIAZEM HYDROCHLORIDE 120 MG: 120 CAPSULE, COATED, EXTENDED RELEASE ORAL at 11:10

## 2017-12-31 RX ADMIN — POTASSIUM CHLORIDE 10 MEQ: 750 TABLET, EXTENDED RELEASE ORAL at 08:55

## 2017-12-31 RX ADMIN — SODIUM CHLORIDE 75 ML/HR: 0.9 INJECTION, SOLUTION INTRAVENOUS at 01:05

## 2017-12-31 RX ADMIN — OXYCODONE HYDROCHLORIDE 10 MG: 10 TABLET ORAL at 05:44

## 2017-12-31 RX ADMIN — LISINOPRIL 40 MG: 20 TABLET ORAL at 08:56

## 2017-12-31 RX ADMIN — OXYCODONE HYDROCHLORIDE 10 MG: 10 TABLET ORAL at 12:37

## 2017-12-31 RX ADMIN — BUDESONIDE AND FORMOTEROL FUMARATE DIHYDRATE 2 PUFF: 160; 4.5 AEROSOL RESPIRATORY (INHALATION) at 08:57

## 2017-12-31 NOTE — CASE MANAGEMENT
Initial Clinical Review    Age/Sex: 80 y o  male    Surgery Date:   12/29/2017    Procedure:   Preop Diagnosis:  Closed displaced bimalleolar fracture of right lower leg [S82 841A]     Post-Op Diagnosis Codes:     * Closed displaced bimalleolar fracture of right lower leg [S82 841A]     Procedure(s) (LRB):  FIBULA, MEDIAL MALLEOLUS FRACTUE OPEN REDUCTION W/ INTERNAL FIXATION; USE OF PLATES AND SCREWS (Right)       Anesthesia:   General with popliteal block    Admission Orders: Date/Time/Statement: 12/30/17 @ 1936     Orders Placed This Encounter   Procedures    Inpatient Admission     Standing Status:   Standing     Number of Occurrences:   1     Order Specific Question:   Admitting Physician     Answer:   Sherice Shankar [[id number]]     Order Specific Question:   Level of Care     Answer:   Med Surg [[id number]]     Order Specific Question:   Estimated length of stay     Answer:   More than 2 Midnights     Order Specific Question:   Certification     Answer:   I certify that inpatient services are medically necessary for this patient for a duration of greater than two midnights  See H&P and MD Progress Notes for additional information about the patient's course of treatment  Comments:   post op recovery       Vital Signs: /65   Pulse (!) 116   Temp 99 1 °F (37 3 °C) (Tympanic)   Resp 18   Ht 5' 10" (1 778 m)   Wt 90 7 kg (200 lb)   SpO2 95%   BMI 28 70 kg/m²     Diet:        Diet Orders            Start     Ordered    12/29/17 1628  Diet Regular; Regular House  Diet effective now     Question Answer Comment   Diet Type Regular    Regular Regular House    RD to adjust diet per protocol?  Yes        12/29/17 1629          Mobility:   As  memo    DVT Prophylaxis:    SCD'S    BP  Ranges     12/29      158/85                             12/30     182/102               174/100    Pain Control:   Pain Medications             mirtazapine (REMERON) 30 mg tablet Take 15 mg by mouth daily at bedtime    traMADol (ULTRAM) 50 mg tablet Take 50 mg by mouth 2 (two) times a day as needed for moderate pain    oxyCODONE-acetaminophen (PERCOCET) 5-325 mg per tablet Take 1 tablet by mouth every 4 (four) hours as needed for moderate pain for up to 10 days Max Daily Amount: 6 tablets      OP NC   ORDER  12/29    IP ORDER  ENTERED    12/30   @    1936    PT/OT    Cons  IM  IV  Apresoline  PRN  (   X 1  12/29  And  X 3   12/30)  Temp   12/29        99 2                 12/30           100        100 3                 12/31      99 1    Per  IM consult:   12/30    -POD#1-ORIF for bimalleolar fracture of the right ankle  Patient complaining of pain over the right ankle and asking for increase in his pain medications      2-low-grade fever likely postop secondary to surgery will continue to monitor  Continue Tylenol     3-atrial fibrillation  Rate controlled patient's Pradaxa has been started from last night  Continue 150 mg twice a day  Continue atenolol 25 mg daily     4-essential hypertension-currently uncontrolled secondary to pain will continue him on his lisinopril 40 mg daily, atenolol 25 mg daily and on hydralazine 10 mg q 6 hours p r n  for SBP more than 160      5-COPD without exacerbation  Continue Symbicort and albuterol p r n        6-impaired fasting glucose-currently stable  His glycosylated hemoglobin has remained stable  Will continue to monitor and add a sliding scale coverage if it is elevated beyond 150      7-mild cognitive decline  Likely secondary to vascular dementia  --this has been stable over the past 6 months      Would recommend that the patient stay tonight for monitoring of his fever as well as blood pressures status if stable could be discharged tomorrow  Thank you,  [facility] in the [facility] by Anastacio Silva for 2017  Network Utilization Review Department  Phone: [phone];  Fax [phone]  ATTENTION: The Network Utilization Review Department is now centralized for our 7 Facilities  Make a note that we have a new phone and fax numbers for our Department  Please call with any questions or concerns to [phone] and carefully follow the prompts so that you are directed to the right person  All voicemails are confidential  Fax any determinations, approvals, denials, and requests for initial or continue stay review clinical to [phone]  Due to HIGH CALL volume, it would be easier if you could please send faxed requests to expedite your requests and in part, help us provide discharge notifications faster

## 2017-12-31 NOTE — SOCIAL WORK
CM received a call from Brianne Bee in PT department that patient was being discharged with home PT  CM called and spoke to Suresh Duke, resident in podiatry, and requested that home PT be written in the discharge summary and a [id number] so VNA services can be requested  Referral sent to [facility] and [facility] as  VNA is unable to accommodate in a reasonable timeframe for PT alone  Left note in [employer] for CM department to f/u to verify if patient was accepted with either agency

## 2017-12-31 NOTE — PHYSICAL THERAPY NOTE
Physical Therapy Tx Session:       12/31/17 1010   Pain Assessment   Pain Assessment 0-10   Pain Score No Pain   Restrictions/Precautions   Weight Bearing Precautions Per Order Yes   RLE Weight Bearing Per Order NWB   Braces or Orthoses Other (Comment)  (RLE cast)   Other Precautions Impulsive;WBS;Multiple lines   General   Chart Reviewed Yes   Family/Caregiver Present No   Cognition   Overall Cognitive Status WFL   Arousal/Participation Cooperative   Attention Within functional limits   Orientation Level Oriented X4   Following Commands Follows one step commands with increased time or repetition   Subjective   Subjective following use of BR, pt agreeable to ambulate further distance with use of personal roll about on tile surface;"I have to do this in order to get home"   Ambulation/Elevation   Gait pattern Forward Flexion;Decreased R stance; Foward flexed   Gait Assistance 5  Supervision   Additional items Assist x 1;Verbal cues   Assistive Device Other (Comment)  (personal roll about)   Distance additional 300 feet with use of personal roll about on tile surface; Pt needed static stand rest break x2 2* fatigue and SOB lasting 20 seconds each   Balance   Static Standing Fair   Dynamic Standing Fair   Ambulatory Fair   Activity Tolerance   Activity Tolerance Patient limited by fatigue  (good)   Assessment   Prognosis Good   Assessment Pt able to ambulate additional 300 feet with use of personal roll about on tile surface needing S level of A  Pt is able to return home with support and care from wife and use of personal DME  Recommend home PT at this time and pt reports being agreeable  No further skilled inpt PT services needed at this time,D/C from PT  Goals   Patient Goals to go home   Treatment Day 1   Plan   Progress Discontinue PT   Recommendation   Recommendation Home PT; Home with family support;D/C PT   Equipment Recommended Walker  (cont use of personal roll about for mobility)

## 2017-12-31 NOTE — PROGRESS NOTES
Progress Note - Gio Russell 80 y o  male MRN: [id number]    Unit/Bed#: E5 -01 Encounter: [id number]      Assessment/Plan:  1-POD#2-ORIF for bimalleolar fracture of the right ankle  pain is now well controlled with adjustment of his pain medications        2-low-grade fever likely postop secondary to surgery       3-atrial fibrillation  Rate controlled patient's Pradaxa has been started following his surgery     Continue 150 mg twice a day  Continue atenolol 25 mg daily     4-essential hypertension-stable currently  continue him on his lisinopril 40 mg daily, atenolol 25 mg daily and on hydralazine 10 mg q 6 hours p r n  for SBP more than 160      5-COPD without exacerbation  Continue Symbicort and albuterol p r n        6-impaired fasting glucose-currently stable  His glycosylated hemoglobin has remained stable  Will continue to monitor and add a sliding scale coverage if it is elevated beyond 150      7-mild cognitive decline  Likely secondary to vascular dementia  --this has been stable over the past 6 months    Stable for discharge from a medical point of view  Subjective:  Patient doing well , blood pressure has now trended down to 125/65  Temp down to 99 1  Patient denies any complaints and states that his pain is now well controlled  Physical Exam:   Vitals: Blood pressure 125/65, pulse 89 temperature 99 1 °F (37 3 °C), temperature source Tympanic, resp  rate 18, height 5' 10" (1 778 m), weight 90 7 kg (200 lb), SpO2 95 %  ,Body mass index is 28 7 kg/m²  Gen:  Pleasant, non-tachypnic, non-dyspnic  Conversant  Heart: regular rate and rhythm, S1S2 present, no murmur, rub or gallop  Lungs: clear to ausculatation bilaterally  No wheezing, crackless, or rhonchi  No accessory muscle use or respiratory distress  Abd: soft, non-tender, non-distended  NABS, no guarding, rebound or peritoneal signs  Extremities:  Right ankle in cast   Neuro: awake, alert and oriented   Cranial nerves 2-12 intact  Strength and sensation grossly intact  Skin: warm and dry: no petechiae, purpura and rash      LABS:     Results from last 7 days  Lab Units 12/31/17  0450 12/30/17  1821   WBC Thousand/uL 13 11*  --    HEMOGLOBIN g/dL 13 6 13 5   HEMATOCRIT % 41 8 41 2   PLATELETS Thousands/uL 237  --        Results from last 7 days  Lab Units 12/31/17  0645   SODIUM mmol/L 142   POTASSIUM mmol/L 4 0   CHLORIDE mmol/L 110*   CO2 mmol/L 24   BUN mg/dL 13   CREATININE mg/dL 0 95   GLUCOSE RANDOM mg/dL 120   CALCIUM mg/dL 9 8       Intake/Output Summary (Last 24 hours) at 12/31/17 1415  Last data filed at 12/31/17 1301   Gross per 24 hour   Intake                0 ml   Output             3750 ml   Net            -3750 ml           atenolol 25 mg Oral Daily   atorvastatin 40 mg Oral Daily   budesonide-formoterol 2 puff Inhalation Daily   dabigatran etexilate 150 mg Oral Q12H STEPHANIE   diltiazem 120 mg Oral Daily   donepezil 10 mg Oral Daily   furosemide 20 mg Oral Daily   lisinopril 40 mg Oral Daily   mirtazapine 15 mg Oral HS   oxybutynin 15 mg Oral Daily   potassium chloride 10 mEq Oral BID

## 2017-12-31 NOTE — PLAN OF CARE
Problem: PHYSICAL THERAPY ADULT  Goal: Performs mobility at highest level of function for planned discharge setting  See evaluation for individualized goals  Treatment/Interventions: Functional transfer training, Endurance training, Patient/family training, Equipment eval/education, Bed mobility, Gait training, Spoke to nursing  Equipment Recommended: Walker (cont use of personal roll about for mobility)       See flowsheet documentation for full assessment, interventions and recommendations  Prognosis: Good  Problem List: Decreased strength, Decreased endurance, Impaired balance, Decreased mobility, Decreased safety awareness, Obesity, Impaired hearing, Decreased skin integrity, Pain  Assessment: Pt is a 81 y/o male admitted [facility] 2* s/p ORIF bilmalleolar R ankle fx  Pt lives with wife in 3 story home with first floor setup available upon D/C  Pt reports owning roll about,RW and SW,RTS  Pt reports being completely I PTA without use of personal DME  Pt reports wife is available to A pt upon D/C  Pt demonstrates limited mobility and gait 2* needing S for ambulation assessment with use of personal rollabout and minAx1 for low toilet seat transfers and S for BM and neutral sit<->stand transfers  Pt would cont to benefit from skilled inpt PT services to maximize functional independence        Recommendation: Home with family support, Home PT          See flowsheet documentation for full assessment

## 2017-12-31 NOTE — PROGRESS NOTES
Progress Note - Podiatry  Guerda Olivares 80 y o  male MRN: [id number]  Unit/Bed#: E5 -01 Encounter: [id number]    Assessment:  3  79 y/o male pt with ambulatory dysfunction s/p ORIF bimalleolar fracture of right ankle  2  RA  3  H/o premature atrial contraction  4  afib  5  HTN  6  HLD  7  H/o CVA  8  COPD     Plan:  -Pt seen and examined at bedside  -Pt's pain is currently controlled; bp is also better controlled with 125/65  - pt is able to wiggle toes  CFT <3 secs to all digits of right foot  - PT recommended pt to be d/c home with family support and home PT  - spoke with Cm; will write need for home PT in discharge summary   - pt cleared for d/c per KEKE; spoke with Dr Anjana García  - pt to be NWB to RLE with walker/knee roller  - pt stable for dc with home Pt; case discussed with attending      >pt to be be discharged home today with home PT      Subjective/Objective   Chief Complaint: No chief complaint on file  Subjective: 80 y o  y/o male was seen and evaluated at bedside  Patient is excited to go home  Pt states his pain is under control  No new complaints  Blood pressure 125/65, pulse (!) 116, temperature 99 1 °F (37 3 °C), temperature source Tympanic, resp  rate 18, height 5' 10" (1 778 m), weight 90 7 kg (200 lb), SpO2 95 %  ,Body mass index is 28 7 kg/m²  Invasive Devices     Peripheral Intravenous Line            Peripheral IV 12/31/17 Right Antecubital less than 1 day          Drain            Urethral Catheter Latex 16 Fr  2 days                Physical Exam:   General: Alert, cooperative and no distress  Lungs: Non labored breathing  Heart: Positive S1, S2  Abdomen: Soft, non-tender  Extremity: MSK function grossly intact, patient able to wiggle toes  CFT to right digits <3 secs  BK fiberglass cast to right foot intact            Lab, Imaging and other studies:   I have personally reviewed pertinent lab results        Imaging: I have personally reviewed pertinent films in PACS  EKG, Pathology, and Other Studies: I have personally reviewed pertinent reports    VTE Pharmacologic Prophylaxis: pradaxa

## 2017-12-31 NOTE — PHYSICAL THERAPY NOTE
Physical Therapy Evaluation    Patient's Name: Ena Gonzales    Admitting Diagnosis  Closed displaced bimalleolar fracture of right lower leg [S86 199W]    Problem List  Patient Active Problem List   Diagnosis    Ambulatory dysfunction       Past Medical History  Past Medical History:   Diagnosis Date    A-fib ([facility] )     COPD (chronic obstructive pulmonary disease) ([place] [address] )     CPAP (continuous positive airway pressure) dependence     CVA (cerebral vascular accident) ([place] [address] )     possible but unsure when it was, was seen on MRI    Hyperlipidemia     Hypertension     PAC (premature atrial contraction)     Rheumatoid arthritis ([place] [address] )     Sleep apnea        Past Surgical History  Past Surgical History:   Procedure Laterality Date    FRACTURE SURGERY      left femur    JOINT REPLACEMENT      shoulder left    PROSTATECTOMY          12/31/17 0950   Note Type   Note type Eval only   Pain Assessment   Pain Assessment 0-10   Pain Score 5   Pain Type Acute pain;Surgical pain  (per pt postop)   Pain Location Ankle;Leg   Pain Orientation [facility]   Hospital Pain Intervention(s) Repositioned; Ambulation/increased activity; Elevated; Emotional support; Environmental changes; Rest   Home Living   Type of Home House   Home Layout Multi-level; Able to live on main level with bedroom/bathroom; Ramped entrance   [address] [facility] [address] [facility]  (pt reports owning SW,RW and currently using roll about)   Additional Comments Pt reports being completely I PTA,owns SW,RW,rollator   Pt reports A from wife upon D/C;ramp to enter and first floor setup available   Prior Function   Level of [place] Independent with ADLs and functional mobility  (per pt PTA)   Lives With Spouse   Receives Help From Family  (per pt PTA as needed)   ADL Assistance Independent   IADLs Independent   Falls in the last 6 months 1 to 4   Restrictions/Precautions   Weight Bearing Precautions Per Order Yes   RLE Weight Bearing Per Order NWB   Braces or Orthoses Other (Comment)  (RLE cast)   Other Precautions WBS; Impulsive;Pain; Fall Risk;Multiple lines   General   Additional Pertinent History s/p ORIF bimalleolar ankle fx 12/29/17   Family/Caregiver Present No   Cognition   Overall Cognitive Status WFL   Arousal/Participation Cooperative   Orientation Level Oriented X4   Following Commands Follows one step commands with increased time or repetition  (2* impulsivity and pain)   RLE Assessment   RLE Assessment (4/5 at least grossly throughout)   LLE Assessment   LLE Assessment WFL   Coordination   Movements are Fluid and Coordinated (impulsive at times)   Sensation [facility]   Light Touch   RLE Light Touch Grossly intact   LLE Light Touch Grossly intact   Bed Mobility   Supine to Sit 5  Supervision   Additional items Assist x 1;Bedrails; Impulsive;Verbal cues   Transfers   Sit to Stand 5  Supervision   Additional items Assist x 1;Bedrails; Impulsive;Verbal cues   Stand to Sit 5  Supervision   Additional items Assist x 1; Armrests; Impulsive;Verbal cues  (for safety and education)   Stand pivot 5  Supervision   Additional items Assist x 1; Impulsive;Verbal cues  (pt able to maintain NWB % of the time during transfer)   Toilet transfer 4  Minimal assistance   Additional items Assist x 1; Increased time required;Standard toilet;Verbal cues  (low toilet transfer;pt reports RTS at home for use)   Ambulation/Elevation   Gait pattern Forward Flexion;Decreased R stance; Foward flexed  (inc speed with use of roll about able to maintain NWB [id number])   Gait Assistance 5  Supervision   Additional items Assist x 1;Verbal cues  (for safety and education)   Assistive Device Other (Comment)  (rollabout personal)   Distance 20 feet with use of roll about on tile surface bed to BR;pt needed verbal instruction to slow down and proper placement of RLE on kneel stand attachment for safety and support   Stair Management Assistance Not tested  (pt reports having ramp to enter)   Balance   Static Sitting Good Dynamic Sitting Fair   Static Standing Fair   Dynamic Standing Fair   Ambulatory Fair   Endurance Deficit   Endurance Deficit Yes   Endurance Deficit Description postop deconditioning,pt reports SOB and fatigue during mobility especially gait   Activity Tolerance   Activity Tolerance Patient limited by fatigue;Patient limited by pain  (good)   Medical Staff Made Aware attempt to call CM on call (Maryana Santos without call back ( to page) for updated D/C recommendations   Assessment   Prognosis Good   Problem List Decreased strength;Decreased endurance; Impaired balance;Decreased mobility; Decreased safety awareness; Obesity; Impaired hearing;Decreased skin integrity;Pain   Assessment Pt is a 79 y/o male admitted [facility]* s/p ORIF bilmalleolar R ankle fx  Pt lives with wife in 3 story home with first floor setup available upon D/C  Pt reports owning roll about,RW and SW,RTS  Pt reports being completely I PTA without use of personal DME  Pt reports wife is available to A pt upon D/C  Pt demonstrates limited mobility and gait 2* needing S for ambulation assessment with use of personal rollabout and minAx1 for low toilet seat transfers and S for BM and neutral sit<->stand transfers  Pt would cont to benefit from skilled inpt PT services to maximize functional independence   Goals   Patient Goals to go home   STG Expiration Date 01/05/18   Short Term Goal #1 in 3-5 days:pt will be able to ambulate >300 feet with use of personal roll about on various surfaces S level of A,activity tolerance:45mins/45mins,inc balance 1 grade,transfers and BM completely I,cont education for NWB RLE and use of roll about   Treatment Day 0   Plan   Treatment/Interventions Functional transfer training; Endurance training;Patient/family training;Equipment eval/education; Bed mobility;Gait training;Spoke to nursing   PT Frequency 5x/wk   Recommendation   Recommendation Home with family support;Home PT   Equipment Recommended Walker  (cont use of personal roll about for mobility)   Barthel Index   Feeding 10   Bathing 0   Grooming Score 5   Dressing Score 5   Bladder Score 0   Bowels Score 10   Toilet Use Score 5   Transfers (Bed/Chair) Score 10   Mobility (Level Surface) Score 0   Stairs Score 0   Barthel Index Score 45         Malik Levin, PT

## 2018-01-12 NOTE — MISCELLANEOUS
Message   Recorded as Task   Date: 08/08/2017 09:18 AM, Created By: Kenisha Vidal   Task Name: Call Back   Assigned To: Stephan RODRIGUEZ,TEAM   Regarding Patient: Mary Vines, Status: Active   Comment:    Kenisha Vidal - 08 Aug 2017 9:18 AM     TASK CREATED  Caller: Self; [phone] (Home)  Wife calling patient self caths and the catheter was changed yesterday had some trouble is ok now  He's c/o severe pain at the tip of his penis  Please advise [address] - 08 Aug 2017 10:02 AM     TASK EDITED  Spoke to patients spouse who stated that patient was having issues with his catheter and had some penile leaking but it is better now  Patient did have a bout of diarrhea  There is also blood in the urine and patient has severe discomfort on tip of penis  Patients wife is a retired nurse and will put aspercreme on the tip and as per Dr Marilee Santana advisement take Tylenol or Motrin for the pain and if any urinary symptoms such as fever,chills or the blood continues to call the office          Signatures   Electronically signed by : Rubi Peters, ; Aug  8 2017 10:02AM EST                       (Author)

## 2018-01-15 NOTE — MISCELLANEOUS
Message   Recorded as Task   Date: 08/29/2017 09:29 AM, Created By: Cecy Cantu   Task Name: Medical Complaint Callback   Assigned To: Stephan RODRIGUEZ,TEAM   Regarding Patient: Jaden Waggoner, Status: Active   CommentOmer Bores - 29 Aug 2017 9:29 AM     TASK CREATED  Caller: ARACELI; Medical Complaint; [phone]  PATIENTS WIFE CALLED SAYING SHE THINKS HE NEEDS ANOTHER ROUND OF ANTIBIOTICS  HE IS STILL NOT FEELING THAT WELL  SHE SAID THAT SOFI TOLD HER THAT SHE COULD CALL AND GET ANOTHER SCRIPT  Aileen Phan - 29 Aug 2017 9:46 AM     TASK EDITED  Had 3 days without discomfort, now has rectal pain which radiates to penis  No fever, urine clear  Will direct to Aileen Ngo PA-C - 29 Aug 2017 9:46 AM     TASK REASSIGNED: Previously Assigned To Stephan RODRIGUEZ,TEAM   Sofi Cintron - 29 Aug 2017 3:31 PM     TASK REPLIED TO: Previously Assigned To Sanaz Morejon  10 day course sent  Please tell wife if this does not resolve the issue 100% he needs to be seen by physician  Aileen Phan - 29 Aug 2017 3:36 PM     TASK EDITED  WIFE NOTIFIED  Active Problems    1  Acute prostatitis (601 0) (N41 0)    Current Meds   1  Aricept 10 MG Oral Tablet (Donepezil HCl); Therapy: (Recorded:[id number]) to Recorded   2  Atenolol 50 MG Oral Tablet; Therapy: (Greta Levi) to Recorded   3  Atorvastatin Calcium 40 MG Oral Tablet; Therapy: (Greta Levi) to Recorded   4  Cardizem  MG Oral Capsule Extended Release 24 Hour (DilTIAZem HCl ER   Coated Beads); Therapy: (Gretadelio Levi) to Recorded   5  Lasix 20 MG Oral Tablet (Furosemide); Therapy: (Gretadelio Naths) to Recorded   6  LevoFLOXacin 500 MG Oral Tablet; TAKE 1 TABLET DAILY UNTIL FINISHED; Therapy: [id number] to (Evaluate:16Szz2854)  Requested for: 87IFP5008; Last   Rx:[id number] Ordered   7   Oxybutynin Chloride ER 15 MG Oral Tablet Extended Release 24 Hour; Therapy: (Steger Freed) to Recorded   8  Potassium Citrate ER 10 MEQ (1080 MG) Oral Tablet Extended Release; Therapy: (Steger Freed) to Recorded   9  Pradaxa 150 MG Oral Capsule; Therapy: (Recorded:[id number]) to Recorded    Allergies    1  No Known Drug Allergies    Plan  Acute prostatitis    · LevoFLOXacin 500 MG Oral Tablet;  Take 1 tablet daily    Signatures   Electronically signed by : Juan Carlos Carrizales RN; Aug 29 2017  3:37PM EST                       (Author)

## 2018-01-15 NOTE — MISCELLANEOUS
Message   Recorded as Task   Date: 08/14/2017 08:53 AM, Created By: Chelsie Pantoja   Task Name: Medical Complaint Callback   Assigned To: Stephan RODRIGUEZ,TEAM   Regarding Patient: Nagi John, Status: Active   CommentRonel Huynh - 14 Aug 2017 8:53 AM     TASK CREATED  Caller: MARANDA, Spouse; Medical Complaint; [phone] (Day)  PATIENTS WIFE MARANDA CALLED SAYING HE STILL HAS THE PAIN AT THE TIP OF HIS PENIS  Brandy Renteria - 14 Aug 2017 9:26 AM     TASK EDITED  APPT SCHEDULED FOR TOMORROW WITH CASTILLO        Current Meds   1  Aricept 10 MG Oral Tablet (Donepezil HCl); Therapy: (Recorded:[id number]) to Recorded   2  Atenolol 50 MG Oral Tablet; Therapy: (Toshia Lyons) to Recorded   3  Atorvastatin Calcium 40 MG Oral Tablet; Therapy: (Toshia Lyons) to Recorded   4  Cardizem  MG Oral Capsule Extended Release 24 Hour (DilTIAZem HCl ER   Coated Beads); Therapy: (Toshia Lyons) to Recorded   5  Coumadin 5 MG Oral Tablet (Warfarin Sodium); Therapy: (Toshia Lyons) to Recorded   6  Lasix 20 MG Oral Tablet (Furosemide); Therapy: (Toshia Lyons) to Recorded   7  Oxybutynin Chloride ER 15 MG Oral Tablet Extended Release 24 Hour; Therapy: (Toshia Lyons) to Recorded   8  Potassium Citrate ER 10 MEQ (1080 MG) Oral Tablet Extended Release; Therapy: (Recorded:06Rom2778) to Recorded    Allergies    1   No Known Drug Allergies    Signatures   Electronically signed by : Ariel Vaughn, ; Aug 14 2017  9:27AM EST                       (Author)

## 2018-01-17 NOTE — MISCELLANEOUS
Message   Recorded as Task   Date: 08/29/2017 09:29 AM, Created By: Rossi Rucker   Task Name: Medical Complaint Callback   Assigned To: Stephan RODRIGUEZ,TEAM   Regarding Patient: Gala Brothers, Status: Active   CommentAna Powell - 29 Aug 2017 9:29 AM     TASK CREATED  Caller: ARACELI; Medical Complaint; [phone]  PATIENTS WIFE CALLED SAYING SHE THINKS HE NEEDS ANOTHER ROUND OF ANTIBIOTICS  HE IS STILL NOT FEELING THAT WELL  SHE SAID THAT JANICE TOLD HER THAT SHE COULD CALL AND GET ANOTHER SCRIPT  Aileen Phan - 29 Aug 2017 9:46 AM     TASK EDITED  Had 3 days without discomfort, now has rectal pain which radiates to penis  No fever, urine clear  Will direct to ELIZABETH Ulloa PA-C  Active Problems    1  Acute prostatitis (601 0) (N41 0)    Current Meds   1  Aricept 10 MG Oral Tablet (Donepezil HCl); Therapy: (Recorded:31Qvm8225) to Recorded   2  Atenolol 50 MG Oral Tablet; Therapy: (Jamie Sumner) to Recorded   3  Atorvastatin Calcium 40 MG Oral Tablet; Therapy: (Jamie Sumner) to Recorded   4  Cardizem  MG Oral Capsule Extended Release 24 Hour (DilTIAZem HCl ER   Coated Beads); Therapy: (Jamie Sumner) to Recorded   5  Lasix 20 MG Oral Tablet (Furosemide); Therapy: (Jamie Sumner) to Recorded   6  LevoFLOXacin 500 MG Oral Tablet; TAKE 1 TABLET DAILY UNTIL FINISHED; Therapy: [id number] to (Evaluate:25Okb5175)  Requested for: 28IFS2491; Last   Rx:[id number] Ordered   7  Oxybutynin Chloride ER 15 MG Oral Tablet Extended Release 24 Hour; Therapy: (Jamie Sumner) to Recorded   8  Potassium Citrate ER 10 MEQ (1080 MG) Oral Tablet Extended Release; Therapy: (Jamie Sumner) to Recorded   9  Pradaxa 150 MG Oral Capsule; Therapy: (Recorded:[id number]) to Recorded    Allergies    1   No Known Drug Allergies    Signatures   Electronically signed by : Melinda Wood RN; Aug 29 2017  9:46AM EST                       (Author)

## 2018-01-18 NOTE — MISCELLANEOUS
Message   Recorded as Task   Date: 08/17/2017 11:13 AM, Created By: Med Saha   Task Name: Call Back   Assigned To: Stephan CarlinB,TEAM   Regarding Patient: Diogo Godoy, Status: Active   Comment:    Med Saha - 17 Aug 2017 11:13 AM     TASK CREATED  Caller: Taisha, Spouse  Pt c/o pain at tip of penis  Tylenol is not helping  Please advise [phone]   Zoraidanickolas Dasilvabreanne - 17 Aug 2017 11:42 AM     TASK EDITED  Please advise pt was just in the office to see [facility]  Zoraida Dasilvabreanne - 17 Aug 2017 11:42 AM     TASK REASSIGNED: Previously Assigned To [id number] - 18 Aug 2017 12:56 PM     TASK EDITED  Pt's wife calling regarding previous message   Brandy Finnegan - 18 Aug 2017 2:07 PM     TASK EDITED  PA-C LMOM TO C/B BY 3:30 TODAY IF STILL HAVING PROBLEMS        Active Problems    1  Acute prostatitis (601 0) (N41 0)    Current Meds   1  Aricept 10 MG Oral Tablet (Donepezil HCl); Therapy: (Recorded:60Kbz0442) to Recorded   2  Atenolol 50 MG Oral Tablet; Therapy: (Catherine Aragon) to Recorded   3  Atorvastatin Calcium 40 MG Oral Tablet; Therapy: (Catherine Aragon) to Recorded   4  Cardizem  MG Oral Capsule Extended Release 24 Hour (DilTIAZem HCl ER   Coated Beads); Therapy: (Catherine Aragon) to Recorded   5  Lasix 20 MG Oral Tablet (Furosemide); Therapy: (Catherine Aragon) to Recorded   6  LevoFLOXacin 500 MG Oral Tablet; TAKE 1 TABLET DAILY UNTIL FINISHED; Therapy: 06OFN3477 to (Evaluate:79Jzp8437)  Requested for: 22HVB5153; Last   Rx:[id number] Ordered   7  Oxybutynin Chloride ER 15 MG Oral Tablet Extended Release 24 Hour; Therapy: (Catherine Aragon) to Recorded   8  Potassium Citrate ER 10 MEQ (1080 MG) Oral Tablet Extended Release; Therapy: (Catherine Omaha) to Recorded   9  Pradaxa 150 MG Oral Capsule; Therapy: (Recorded:[id number]) to Recorded    Allergies    1   No Known Drug Allergies    Signatures Electronically signed by : Florence Amador, ; Aug 18 2017  2:07PM EST                       (Author)

## 2018-01-22 VITALS
BODY MASS INDEX: 30.21 KG/M2 | DIASTOLIC BLOOD PRESSURE: 82 MMHG | WEIGHT: 204 LBS | SYSTOLIC BLOOD PRESSURE: 120 MMHG | HEIGHT: 69 IN

## 2018-01-22 VITALS
HEIGHT: 66 IN | DIASTOLIC BLOOD PRESSURE: 83 MMHG | BODY MASS INDEX: 33.27 KG/M2 | WEIGHT: 207 LBS | SYSTOLIC BLOOD PRESSURE: 140 MMHG

## 2018-01-22 VITALS
BODY MASS INDEX: 32.78 KG/M2 | HEIGHT: 66 IN | SYSTOLIC BLOOD PRESSURE: 128 MMHG | DIASTOLIC BLOOD PRESSURE: 78 MMHG | WEIGHT: 204 LBS

## 2018-04-02 ENCOUNTER — OFFICE VISIT (OUTPATIENT)
Dept: UROLOGY | Facility: MEDICAL CENTER | Age: 83
End: 2018-04-02
Payer: COMMERCIAL

## 2018-04-02 VITALS
SYSTOLIC BLOOD PRESSURE: 126 MMHG | DIASTOLIC BLOOD PRESSURE: 68 MMHG | BODY MASS INDEX: 28.2 KG/M2 | HEIGHT: 70 IN | WEIGHT: 197 LBS

## 2018-04-02 DIAGNOSIS — C61 ADENOCARCINOMA OF PROSTATE (HCC): ICD-10-CM

## 2018-04-02 DIAGNOSIS — N32.81 OVERACTIVE BLADDER: Primary | ICD-10-CM

## 2018-04-02 PROBLEM — F01.50 VASCULAR DEMENTIA WITHOUT BEHAVIORAL DISTURBANCE (HCC): Status: ACTIVE | Noted: 2017-06-15

## 2018-04-02 PROBLEM — J44.9 CHRONIC OBSTRUCTIVE PULMONARY DISEASE (HCC): Status: ACTIVE | Noted: 2018-02-15

## 2018-04-02 PROCEDURE — 99214 OFFICE O/P EST MOD 30 MIN: CPT | Performed by: UROLOGY

## 2018-04-02 NOTE — PROGRESS NOTES
Assessment/Plan:    Adenocarcinoma of prostate [facility])  Patient completed radiation therapy in November 2011  His most recent PSA was less than 0 1 in October 2017  We will plan to repeat his PSA prior to his next visit  Overactive bladder  Patient failed multiple therapies for his overactive bladder and is presently maintained within chronic indwelling Jacobs catheter which is changed monthly by his wife  He continues to take oxybutynin xl as well  He and his wife are satisfied with the indwelling Jacobs at the present time  We will continue present therapy and he will return in 6 months  Diagnoses and all orders for this visit:    Overactive bladder    Adenocarcinoma of prostate ([facility] [address] )  -     PSA Total, Diagnostic; Future          Subjective:      Patient ID: Paula Shah is a 80 y o  male  80-year-old male with history of prostate cancer who is followed post IMRT and for overactive bladder  He is maintained urologically with a chronic indwelling Jacobs catheter  He is satisfied with the use of the catheter at this time  There is no gross hematuria or symptoms of infection  He continues to take oxybutynin extended release 15 mg daily  He does not have any incontinence  There is no new bone or back pain  The following portions of the patient's history were reviewed and updated as appropriate: allergies, current medications, past family history, past medical history, past social history, past surgical history and problem list     Review of Systems   Constitutional: Negative for chills, diaphoresis, fatigue and fever  HENT: Negative  Eyes: Negative  Respiratory: Negative  Cardiovascular: Negative  Endocrine: Negative  Musculoskeletal: Negative  Skin: Negative  Allergic/Immunologic: Negative  Neurological: Negative  Hematological: Negative  Psychiatric/Behavioral: Negative            Objective:      /68 (BP Location: Left arm, Patient Position: Sitting)  5' 10" (1 778 m)   Wt 89 4 kg (197 lb)   BMI 28 27 kg/m²          Physical Exam   Constitutional: He is oriented to person, place, and time  He appears well-developed and well-nourished  HENT:   Head: Normocephalic and atraumatic  Eyes: Conjunctivae are normal    Neck: Neck supple  Cardiovascular: Normal rate  Pulmonary/Chest: Effort normal    Abdominal: Soft  He exhibits no distension and no mass  There is no tenderness  There is no rebound and no guarding  No inguinal hernia   Genitourinary: Rectum normal, testes normal and penis normal  No penile tenderness  Genitourinary Comments: Jacobs in place, urine clear   Prostate flat and free of induration   Musculoskeletal: Normal range of motion  Neurological: He is alert and oriented to person, place, and time  Skin: Skin is warm and dry  Psychiatric: He has a normal mood and affect  His behavior is normal  Judgment and thought content normal    Vitals reviewed

## 2018-04-02 NOTE — LETTER
April 2, 2018     [place] [address]ves, [address]  [facility] [address]    Patient: Christine Freitas   YOB: 1935   Date of Visit: 4/2/2018       Dear Dr Jovanni Lucia:    Thank you for referring Christine Freitas to me for evaluation  Below are my notes for this consultation  If you have questions, please do not hesitate to call me  I look forward to following your patient along with you           Sincerely,        Giancarlo Delgado MD        CC: No Recipients

## 2018-04-02 NOTE — ASSESSMENT & PLAN NOTE
Patient failed multiple therapies for his overactive bladder and is presently maintained within chronic indwelling Jacobs catheter which is changed monthly by his wife  He continues to take oxybutynin xl as well  He and his wife are satisfied with the indwelling Jacobs at the present time  We will continue present therapy and he will return in 6 months

## 2018-04-02 NOTE — ASSESSMENT & PLAN NOTE
Patient completed radiation therapy in November 2011  His most recent PSA was less than 0 1 in October 2017  We will plan to repeat his PSA prior to his next visit

## 2018-04-02 NOTE — PATIENT INSTRUCTIONS
Jacobs Catheter Placement and Care   WHAT YOU NEED TO KNOW:   A Jacobs catheter is a sterile tube that is inserted into your bladder to drain urine  It is also called an indwelling urinary catheter  The tip of the catheter has a small balloon filled with solution that holds the catheter inside your bladder  DISCHARGE INSTRUCTIONS:   Return to the emergency department if:   · Your catheter comes out  · You suddenly have material that looks like sand in the tubing or drainage bag  · No urine is draining into the bag and you have checked the system  · You have pain in your hip, back, pelvis, or lower abdomen  · You are confused or cannot think clearly  Contact your healthcare provider if:   · You have a fever  · You have bladder spasms for more than 1 day after the catheter is placed  · You see blood in the tubing or drainage bag  · You have a rash or itching where the catheter tube is secured to your skin  · Urine leaks from or around the catheter, tubing, or drainage bag  · The closed drainage system has accidently come open or apart  · You see a layer of crystals inside the tubing  · You have questions or concerns about your condition or care  Care for your Jacobs catheter:   · Clean your genital area 2 times every day  Clean your catheter and the area around where it was inserted  Use soap and water  Clean your anal opening and catheter area after every bowel movement  · Secure the catheter tube  so you do not pull or move the catheter  This helps prevent pain and bladder spasms  Healthcare providers will show you how to use medical tape or a strap to secure the catheter tube to your body  · Keep a closed drainage system  Your Jacobs catheter should always be attached to the drainage bag to form a closed system  Do not disconnect any part of the closed system unless you need to change the bag  Care for your drainage bag:   · Ask if a leg bag is right for you    A leg bag can be worn under your clothes  Ask your healthcare provider for more information about a leg bag  · Keep the drainage bag below the level of your waist   This helps stop urine from moving back up the tubing and into your bladder  Do not loop or kink the tubing  This can cause urine to back up and collect in your bladder  Do not let the drainage bag touch or lie on the floor  · Empty the drainage bag when needed  The weight of a full drainage bag can be painful  Empty the drainage bag every 3 to 6 hours or when it is ? full  · Clean and change the drainage bag as directed  Ask your healthcare provider how often you should change the drainage bag and what cleaning solution to use  Wear disposable gloves when you change the bag  Do not allow the end of the catheter or tubing to touch anything  Clean the ends with an alcohol pad before you reconnect them  What to do if problems develop:   · No urine is draining into the bag:      ¨ Check for kinks in the tubing and straighten them out  ¨ Check the tape or strap used to secure the catheter tube to your skin  Make sure it is not blocking the tube  ¨ Make sure you are not sitting or lying on the tubing  ¨ Make sure the urine bag is hanging below the level of your waist     · Urine leaks from or around the catheter, tubing, or drainage bag:  Check if the closed drainage system has accidently come open or apart  Clean the catheter and tubing ends with a new alcohol pad and reconnect them  Prevent an infection:   · Wash your hands often  Wash before and after you touch your catheter, tubing, or drainage bag  Use soap and water  Wear clean disposable gloves when you care for your catheter or disconnect the drainage bag  Wash your hands before you prepare or eat food  · Drink liquids as directed  Ask your healthcare provider how much liquid to drink each day and which liquids are best for you   Liquids will help flush your kidneys and bladder to help prevent infection  Follow up with your healthcare provider as directed:  Write down your questions so you remember to ask them during your visits  © 2017 [address] [employer] Information is for End User's use only and may not be sold, redistributed or otherwise used for commercial purposes  All illustrations and images included in CareNotes® are the copyrighted property of A D A M [employer] Inc  or Anastacio Silva  The above information is an  only  It is not intended as medical advice for individual conditions or treatments  Talk to your doctor, nurse or pharmacist before following any medical regimen to see if it is safe and effective for you

## 2018-08-06 ENCOUNTER — TRANSCRIBE ORDERS (OUTPATIENT)
Dept: LAB | Facility: CLINIC | Age: 83
End: 2018-08-06

## 2018-09-14 ENCOUNTER — OFFICE VISIT (OUTPATIENT)
Dept: UROLOGY | Facility: MEDICAL CENTER | Age: 83
End: 2018-09-14
Payer: COMMERCIAL

## 2018-09-14 VITALS
BODY MASS INDEX: 27.16 KG/M2 | DIASTOLIC BLOOD PRESSURE: 68 MMHG | HEIGHT: 71 IN | WEIGHT: 194 LBS | SYSTOLIC BLOOD PRESSURE: 122 MMHG

## 2018-09-14 DIAGNOSIS — N32.81 OVERACTIVE BLADDER: Primary | ICD-10-CM

## 2018-09-14 DIAGNOSIS — C61 ADENOCARCINOMA OF PROSTATE (HCC): ICD-10-CM

## 2018-09-14 PROCEDURE — 99214 OFFICE O/P EST MOD 30 MIN: CPT | Performed by: UROLOGY

## 2018-09-14 RX ORDER — BUDESONIDE AND FORMOTEROL FUMARATE DIHYDRATE 160; 4.5 UG/1; UG/1
AEROSOL RESPIRATORY (INHALATION) 2 TIMES DAILY PRN
COMMUNITY
Start: 2018-06-06

## 2018-09-14 NOTE — ASSESSMENT & PLAN NOTE
Patient is on was 7 years post IMRT  PSA less than 0 1 in October 2017  He will have a repeat PSA in October 2018

## 2018-09-14 NOTE — PROGRESS NOTES
Assessment/Plan:    Adenocarcinoma of prostate [facility])  Patient is on was 7 years post IMRT  PSA less than 0 1 in October 2017  He will have a repeat PSA in October 2018  Overactive bladder  He is maintained urologically with a chronic Jacobs  He has had bladder spasms after Jacobs catheter change and we will plan his next catheter change in the office  The procedure can thus be reviewed with his wife  Otherwise he is doing well and there satisfied with chronic Jacobs catheterization  Continue oxybutynin XL 15 mg daily  We will plan cystoscopy as well to evaluate his bladder and for the occasional intermittent episodes of gross hematuria  Diagnoses and all orders for this visit:    Overactive bladder    Adenocarcinoma of prostate ([facility] [address] )    Other orders  -     budesonide-formoterol (SYMBICORT) 160-4 5 mcg/act inhaler; INHALE 2 PUFFS TWICE DAILY  RINSE AND SPIT MOUTH AFTER USE          Subjective:      Patient ID: Chrystal Patel is a 80 y o  male  Chief complaint:  Overactive bladder and prostate cancer    59-year-old male followed for the above complaints  He is maintained urologically with a chronic Jacobs catheter which is changed by his wife  He is generally doing well  Occasionally there is some leakage around the penis  He has had occasional  intermittent episodes of gross hematuria as well  The patient's wife reports that he has had some pain with catheter changes  At the present time his urine is clear  He is comfortable with the catheter  He has no fever, flank or back pain  He is satisfied with this method of therapy for his overactive bladder  The following portions of the patient's history were reviewed and updated as appropriate: allergies, current medications, past family history, past medical history, past social history, past surgical history and problem list     Review of Systems   Constitutional: Negative for chills, diaphoresis, fatigue and fever  HENT: Negative  Eyes: Negative  Respiratory: Negative  Cardiovascular: Negative  Endocrine: Negative  Musculoskeletal: Negative  Skin: Negative  Allergic/Immunologic: Negative  Neurological: Negative  Hematological: Negative  Psychiatric/Behavioral: Negative  Objective:      /68   Ht 5' 11" (1 803 m)   Wt 88 kg (194 lb)   BMI 27 06 kg/m²          Physical Exam   Constitutional: He is oriented to person, place, and time  He appears well-developed and well-nourished  HENT:   Head: Normocephalic and atraumatic  Eyes: Conjunctivae are normal    Neck: Neck supple  Cardiovascular: Normal rate  Pulmonary/Chest: Effort normal    Abdominal: Soft  He exhibits no distension and no mass  There is no tenderness  There is no rebound and no guarding  No inguinal hernia   Genitourinary: Rectum normal and penis normal  No penile tenderness  Genitourinary Comments: Jacobs in place-urine clear  Bilateral hydroceles  Prostate flat and free of induration   Musculoskeletal: Normal range of motion  NO CVAT   Neurological: He is alert and oriented to person, place, and time  Skin: Skin is warm and dry  Psychiatric: He has a normal mood and affect  His behavior is normal  Judgment and thought content normal    Vitals reviewed

## 2018-09-14 NOTE — ASSESSMENT & PLAN NOTE
He is maintained urologically with a chronic Jacobs  He has had bladder spasms after Jacobs catheter change and we will plan his next catheter change in the office  The procedure can thus be reviewed with his wife  Otherwise he is doing well and there satisfied with chronic Jacobs catheterization  Continue oxybutynin XL 15 mg daily  We will plan cystoscopy as well to evaluate his bladder and for the occasional intermittent episodes of gross hematuria

## 2018-09-14 NOTE — LETTER
September 14, 2018     [place] Delgado, [address] [facility] [address]    Patient: Mateo French   YOB: 1935   Date of Visit: 9/14/2018       Dear Dr Mallika Peres:    Thank you for referring Mateo French to me for evaluation  Below are my notes for this consultation  If you have questions, please do not hesitate to call me  I look forward to following your patient along with you  Sincerely,        Eduardo King MD        CC: No Recipients  Eduardo King MD  9/14/2018 11:48 AM  Sign at close encounter  Assessment/Plan:    Adenocarcinoma of prostate [facility])  Patient is on was 7 years post IMRT  PSA less than 0 1 in October 2017  He will have a repeat PSA in October 2018  Overactive bladder  He is maintained urologically with a chronic Jacobs  He has had bladder spasms after Jacobs catheter change and we will plan his next catheter change in the office  The procedure can thus be reviewed with his wife  Otherwise he is doing well and there satisfied with chronic Jacobs catheterization  Continue oxybutynin XL 15 mg daily  We will plan cystoscopy as well to evaluate his bladder and for the occasional intermittent episodes of gross hematuria  Diagnoses and all orders for this visit:    Overactive bladder    Adenocarcinoma of prostate ([facility] [address] )    Other orders  -     budesonide-formoterol (SYMBICORT) 160-4 5 mcg/act inhaler; INHALE 2 PUFFS TWICE DAILY  RINSE AND SPIT MOUTH AFTER USE          Subjective:      Patient ID: Mateo French is a 80 y o  male  Chief complaint:  Overactive bladder and prostate cancer    80-year-old male followed for the above complaints  He is maintained urologically with a chronic Jacobs catheter which is changed by his wife  He is generally doing well  Occasionally there is some leakage around the penis  He has had occasional  intermittent episodes of gross hematuria as well    The patient's wife reports that he has had some pain with catheter changes  At the present time his urine is clear  He is comfortable with the catheter  He has no fever, flank or back pain  He is satisfied with this method of therapy for his overactive bladder  The following portions of the patient's history were reviewed and updated as appropriate: allergies, current medications, past family history, past medical history, past social history, past surgical history and problem list     Review of Systems   Constitutional: Negative for chills, diaphoresis, fatigue and fever  HENT: Negative  Eyes: Negative  Respiratory: Negative  Cardiovascular: Negative  Endocrine: Negative  Musculoskeletal: Negative  Skin: Negative  Allergic/Immunologic: Negative  Neurological: Negative  Hematological: Negative  Psychiatric/Behavioral: Negative  Objective:      /68   Ht 5' 11" (1 803 m)   Wt 88 kg (194 lb)   BMI 27 06 kg/m²           Physical Exam   Constitutional: He is oriented to person, place, and time  He appears well-developed and well-nourished  HENT:   Head: Normocephalic and atraumatic  Eyes: Conjunctivae are normal    Neck: Neck supple  Cardiovascular: Normal rate  Pulmonary/Chest: Effort normal    Abdominal: Soft  He exhibits no distension and no mass  There is no tenderness  There is no rebound and no guarding  No inguinal hernia   Genitourinary: Rectum normal and penis normal  No penile tenderness  Genitourinary Comments: Jacobs in place-urine clear  Bilateral hydroceles  Prostate flat and free of induration   Musculoskeletal: Normal range of motion  NO CVAT   Neurological: He is alert and oriented to person, place, and time  Skin: Skin is warm and dry  Psychiatric: He has a normal mood and affect  His behavior is normal  Judgment and thought content normal    Vitals reviewed

## 2018-09-14 NOTE — PATIENT INSTRUCTIONS
Jacobs Catheter Placement and Care   WHAT YOU NEED TO KNOW:   A Jacobs catheter is a sterile tube that is inserted into your bladder to drain urine  It is also called an indwelling urinary catheter  The tip of the catheter has a small balloon filled with solution that holds the catheter inside your bladder  DISCHARGE INSTRUCTIONS:   Return to the emergency department if:   · Your catheter comes out  · You suddenly have material that looks like sand in the tubing or drainage bag  · No urine is draining into the bag and you have checked the system  · You have pain in your hip, back, pelvis, or lower abdomen  · You are confused or cannot think clearly  Contact your healthcare provider if:   · You have a fever  · You have bladder spasms for more than 1 day after the catheter is placed  · You see blood in the tubing or drainage bag  · You have a rash or itching where the catheter tube is secured to your skin  · Urine leaks from or around the catheter, tubing, or drainage bag  · The closed drainage system has accidently come open or apart  · You see a layer of crystals inside the tubing  · You have questions or concerns about your condition or care  Care for your Jacobs catheter:   · Clean your genital area 2 times every day  Clean your catheter and the area around where it was inserted  Use soap and water  Clean your anal opening and catheter area after every bowel movement  · Secure the catheter tube  so you do not pull or move the catheter  This helps prevent pain and bladder spasms  Healthcare providers will show you how to use medical tape or a strap to secure the catheter tube to your body  · Keep a closed drainage system  Your Jcaobs catheter should always be attached to the drainage bag to form a closed system  Do not disconnect any part of the closed system unless you need to change the bag  Care for your drainage bag:   · Ask if a leg bag is right for you    A leg bag can be worn under your clothes  Ask your healthcare provider for more information about a leg bag  · Keep the drainage bag below the level of your waist   This helps stop urine from moving back up the tubing and into your bladder  Do not loop or kink the tubing  This can cause urine to back up and collect in your bladder  Do not let the drainage bag touch or lie on the floor  · Empty the drainage bag when needed  The weight of a full drainage bag can be painful  Empty the drainage bag every 3 to 6 hours or when it is ? full  · Clean and change the drainage bag as directed  Ask your healthcare provider how often you should change the drainage bag and what cleaning solution to use  Wear disposable gloves when you change the bag  Do not allow the end of the catheter or tubing to touch anything  Clean the ends with an alcohol pad before you reconnect them  What to do if problems develop:   · No urine is draining into the bag:      ¨ Check for kinks in the tubing and straighten them out  ¨ Check the tape or strap used to secure the catheter tube to your skin  Make sure it is not blocking the tube  ¨ Make sure you are not sitting or lying on the tubing  ¨ Make sure the urine bag is hanging below the level of your waist     · Urine leaks from or around the catheter, tubing, or drainage bag:  Check if the closed drainage system has accidently come open or apart  Clean the catheter and tubing ends with a new alcohol pad and reconnect them  Prevent an infection:   · Wash your hands often  Wash before and after you touch your catheter, tubing, or drainage bag  Use soap and water  Wear clean disposable gloves when you care for your catheter or disconnect the drainage bag  Wash your hands before you prepare or eat food  · Drink liquids as directed  Ask your healthcare provider how much liquid to drink each day and which liquids are best for you   Liquids will help flush your kidneys and bladder to help prevent infection  Follow up with your healthcare provider as directed:  Write down your questions so you remember to ask them during your visits  © 2017 [address] [employer] Information is for End User's use only and may not be sold, redistributed or otherwise used for commercial purposes  All illustrations and images included in CareNotes® are the copyrighted property of A D A M [employer] Inc  or Anastacio Silva  The above information is an  only  It is not intended as medical advice for individual conditions or treatments  Talk to your doctor, nurse or pharmacist before following any medical regimen to see if it is safe and effective for you  Cystoscopy   AMBULATORY CARE:   A cystoscopy  is a procedure to look inside of your urethra and bladder using a cystoscope  A cystoscope is a small tube with a light and magnifying camera on the end  The procedure is used to diagnose and treat conditions of the bladder, urethra, and prostate  The procedure is also done to remove stones or blood clots from the urethra or bladder  Your healthcare provider may do other tests, such as ureteroscopy, during a cystoscopy  Prepare for your cystoscopy: You may need to stop smoking several days before your procedure, if you are having general anesthesia  Tell your healthcare provider what medicines you take  Your healthcare provider will tell you what medicines to take and not to take on the day of your procedure  You may need to stop taking medicines such as anticoagulants, aspirin, and ibuprofen several days before your procedure  He may tell you stop eating after midnight the night before your procedure  You may be asked to drink a large amount of liquids before your procedure  Make plans for someone to drive you home after your procedure  During your cystoscopy:   · You may be given general anesthesia to keep you asleep and pain free during your procedure   Your healthcare provider may give you anesthesia in your spine  With spinal anesthesia the lower part of your body will be numb  You will not feel pain during your procedure  Your healthcare provider may instead use local anesthesia that is put into your urethra and bladder  You will not feel pain, but you may be able to feel some pressure during your procedure  With local anesthesia, you may feel burning or need to urinate when the cystoscope is put in and removed  · You will be placed on your back and your feet may be placed in stirrups  The cystoscope will be will be placed through your urethra and into your bladder  The urologist will look at the walls of your urethra as the scope goes through to your bladder  Your bladder may be filled with an irrigation liquid to help your urologist see inside of your bladder more clearly   Special tools may used to remove tissue or stones  Your urologist may use a special tool to stop bleeding in your bladder  If there are blood clots in your bladder, your healthcare provider will inject an irrigation fluid into your bladder  Then he will use suction to remove the fluid and blood clots  After a cystoscopy:  After you are fully awake, you will go home  After your cystoscopy, it is normal to have pink-colored urine  It is also normal to have an increased need to urinate  You may have burning when you urinate  If you had general anesthesia, it may take at least 24 hours before you feel like your usual self  Risks of a cystoscopy: You may bleed more than expected or develop an infection  Swelling caused by the cystoscopy may cause a blockage or slow urine flow  Seek care immediately if:   · Your urine turns from pink to red, or you have clots in your urine  · You cannot urinate and your bladder feels full  · Your pain or burning becomes worse or lasts longer than 2 days  Contact your healthcare provider or urologist if:   · Your urine stays pink for longer than 3 days      · Your pain or burning becomes worse  · Your skin is itchy, swollen, or has a new rash  · You have a fever and chills  · You have questions or concerns about your condition or care  After your cystoscopy: It is normal to have pink-colored urine  It is also normal to have an increased need to urinate and burning when you urinate  If you had general anesthesia, it may take at least 24 hours before you feel like your usual self  · Drink at least 3 to 4 glasses of water daily for 2 days after your procedure  Avoid acidic juices such as orange juice and lemonade  Water can help prevent blood clots from forming  It can also help decrease the amount of acid in your urine that can cause burning  · Sit in a warm tub of water  Warm baths relieve pain and bladder spasms  · Do not have sex  until your healthcare provider tells you it is okay  Sex may increase your risk for a urinary tract infection  Medicines: You may  be given any of the following:  · Antibiotics  help treat or prevent a bacterial infection  · Acetaminophen  decreases pain and fever  It is available without a doctor's order  Ask how much to take and how often to take it  Follow directions  Read the labels of all other medicines you are using to see if they also contain acetaminophen, or ask your doctor or pharmacist  Acetaminophen can cause liver damage if not taken correctly  Do not use more than 4 grams (4,000 milligrams) total of acetaminophen in one day  · Take your medicine as directed  Contact your healthcare provider if you think your medicine is not helping or if you have side effects  Tell him or her if you are allergic to any medicine  Keep a list of the medicines, vitamins, and herbs you take  Include the amounts, and when and why you take them  Bring the list or the pill bottles to follow-up visits  Carry your medicine list with you in case of an emergency  Follow up with your healthcare provider as directed:   You may need to have another cystoscopy  Write down your questions so you remember to ask them during your visits  © 2017 [employer] [employer] Information is for End User's use only and may not be sold, redistributed or otherwise used for commercial purposes  All illustrations and images included in CareNotes® are the copyrighted property of [employer]  or Anastacio Silva  The above information is an  only  It is not intended as medical advice for individual conditions or treatments  Talk to your doctor, nurse or pharmacist before following any medical regimen to see if it is safe and effective for you

## 2018-09-28 ENCOUNTER — CLINICAL SUPPORT (OUTPATIENT)
Dept: UROLOGY | Facility: MEDICAL CENTER | Age: 83
End: 2018-09-28
Payer: COMMERCIAL

## 2018-09-28 VITALS
SYSTOLIC BLOOD PRESSURE: 138 MMHG | WEIGHT: 194 LBS | BODY MASS INDEX: 27.77 KG/M2 | DIASTOLIC BLOOD PRESSURE: 82 MMHG | HEIGHT: 70 IN

## 2018-09-28 DIAGNOSIS — R33.8 ACUTE URINARY RETENTION: Primary | ICD-10-CM

## 2018-09-28 PROCEDURE — 99211 OFF/OP EST MAY X REQ PHY/QHP: CPT

## 2018-09-28 PROCEDURE — 4040F PNEUMOC VAC/ADMIN/RCVD: CPT

## 2018-09-28 NOTE — PROGRESS NOTES
Patient returns for Jacobs change  This is usually taken care of by the patient's wife; However, she wanted a refresher course on proper catheter care and technique  The patient was placed in the supine position and the indwelling catheter was removed gently and without complication  A _16__ fr  catheter was inserted into the bladder using sterile technique  There was minor difficultly getting the catheter past the patient's prostate, which wife notes has been a problem for her too  We gently worked it into the bladder  Patient's Jacobs was attached to a leg bag; urine output is clear and yellow  Neither the patient nor his wife have any questions concerning the catheter change  They will return for their next scheduled appointment and the patient's wife will continue with the patient's Jacobs changes

## 2018-09-28 NOTE — PROGRESS NOTES
I have reviewed the notes, assessments, and/or procedures performed , I concur with her/his documentation of Erin Suarez

## 2018-11-11 DIAGNOSIS — N32.81 OVERACTIVE BLADDER: Primary | ICD-10-CM

## 2018-11-12 RX ORDER — OXYBUTYNIN CHLORIDE 15 MG/1
TABLET, EXTENDED RELEASE ORAL
Qty: 90 TABLET | Refills: 3 | Status: SHIPPED | OUTPATIENT
Start: 2018-11-12 | End: 2019-02-01 | Stop reason: ALTCHOICE

## 2018-11-16 ENCOUNTER — PROCEDURE VISIT (OUTPATIENT)
Dept: UROLOGY | Facility: MEDICAL CENTER | Age: 83
End: 2018-11-16
Payer: COMMERCIAL

## 2018-11-16 ENCOUNTER — TELEPHONE (OUTPATIENT)
Dept: UROLOGY | Facility: MEDICAL CENTER | Age: 83
End: 2018-11-16

## 2018-11-16 VITALS
DIASTOLIC BLOOD PRESSURE: 80 MMHG | SYSTOLIC BLOOD PRESSURE: 140 MMHG | BODY MASS INDEX: 27.16 KG/M2 | WEIGHT: 194 LBS | HEIGHT: 71 IN

## 2018-11-16 DIAGNOSIS — R31.0 HEMATURIA, GROSS: Primary | ICD-10-CM

## 2018-11-16 PROCEDURE — 52000 CYSTOURETHROSCOPY: CPT | Performed by: UROLOGY

## 2018-11-16 NOTE — LETTER
November 16, 2018     Taz Jarvis MD  [facility]  [address]  [place] [address]    Patient: Ena Gonzales   YOB: 1935   Date of Visit: 11/16/2018       Dear Dr Katie Zavaleta:    Thank you for referring Ena Gonzales to me for evaluation  Below are my notes for this consultation  If you have questions, please do not hesitate to call me  I look forward to following your patient along with you  Sincerely,        Taz Jarvis MD        CC: No Recipients  Taz Jarvis MD  11/16/2018  3:31 PM  Sign at close encounter  Cystoscopy  Date/Time: 11/16/2018 3:28 PM  Performed by: Hayley Knox  Authorized by: Hayley Knox     Procedure details: cystoscopy    Patient tolerance: Patient tolerated the procedure well with no immediate complications    Additional Procedure Details: Cystoscopy Procedure Note        Pre-operative Diagnosis: Hematuria    Post-operative Diagnosis:  Overactive bladder      Procedure Details   The risks, benefits, complications, treatment options, and expected outcomes were discussed with the patient  The patient concurred with the proposed plan, giving informed consent  Cystoscopy was performed today under local anesthesia, using sterile technique  The patient was placed in the supine position, prepped and draped in the usual sterile fashion  A 15 [place] flexible cystoscope  was used to inspect both the urethra and bladder  Findings:  Normal urethra, open prostatic fossa with evidence of false passage under the bladder  The bladder neck is open  The bladder is heavily trabeculated with cellule and diverticula formation there is no tumor seen  The bladder is very irritable and did spasm around the cystoscope  After completion of the cystoscopy your a 16 [place] coude Jacobs catheter was placed  There was some difficulty placing a straight Jacobs catheter  The coude catheter irrigated well and 10 cc was placed in the balloon  Discussion:  The patient is is maintained with a chronic catheter  He remains on oxybutynin  He continues to leak around the Jacobs catheter  I feel he might benefit from intravesical Botox injection to help with his bladder irritability

## 2018-11-16 NOTE — PATIENT INSTRUCTIONS

## 2018-11-16 NOTE — PROGRESS NOTES
Cystoscopy  Date/Time: 11/16/2018 3:28 PM  Performed by: Venkatesh Sawyer  Authorized by: Venkatesh Sawyer     Procedure details: cystoscopy    Patient tolerance: Patient tolerated the procedure well with no immediate complications    Additional Procedure Details: Cystoscopy Procedure Note        Pre-operative Diagnosis: Hematuria    Post-operative Diagnosis:  Overactive bladder      Procedure Details   The risks, benefits, complications, treatment options, and expected outcomes were discussed with the patient  The patient concurred with the proposed plan, giving informed consent  Cystoscopy was performed today under local anesthesia, using sterile technique  The patient was placed in the supine position, prepped and draped in the usual sterile fashion  A 15 [place]i flexible cystoscope  was used to inspect both the urethra and bladder  Findings:  Normal urethra, open prostatic fossa with evidence of false passage under the bladder  The bladder neck is open  The bladder is heavily trabeculated with cellule and diverticula formation there is no tumor seen  The bladder is very irritable and did spasm around the cystoscope  After completion of the cystoscopy your a 16 [place]i coude Jacobs catheter was placed  There was some difficulty placing a straight Jacobs catheter  The coude catheter irrigated well and 10 cc was placed in the balloon  Discussion:  The patient is is maintained with a chronic catheter  He remains on oxybutynin  He continues to leak around the Jacobs catheter  I feel he might benefit from intravesical Botox injection to help with his bladder irritability

## 2018-11-16 NOTE — TELEPHONE ENCOUNTER
Patient seen in office 11/16/18 with Dr Vega Smith, Patient deciding if he wants to proceed with Cysto, Botox  Consent signed and scanned into chart  Patient will call to schedule   Patient and his wife aware that if they call further out than 30-60 days he will need an office visit first

## 2018-11-27 ENCOUNTER — TELEPHONE (OUTPATIENT)
Dept: UROLOGY | Facility: MEDICAL CENTER | Age: 83
End: 2018-11-27

## 2018-11-27 ENCOUNTER — APPOINTMENT (OUTPATIENT)
Dept: LAB | Facility: HOSPITAL | Age: 83
End: 2018-11-27
Attending: UROLOGY
Payer: COMMERCIAL

## 2018-11-27 DIAGNOSIS — R30.0 DYSURIA: Primary | ICD-10-CM

## 2018-11-27 DIAGNOSIS — N30.90 CYSTITIS: Primary | ICD-10-CM

## 2018-11-27 DIAGNOSIS — N30.90 CYSTITIS: ICD-10-CM

## 2018-11-27 PROCEDURE — 87186 SC STD MICRODIL/AGAR DIL: CPT

## 2018-11-27 PROCEDURE — 87086 URINE CULTURE/COLONY COUNT: CPT

## 2018-11-27 RX ORDER — CEPHALEXIN 500 MG/1
500 CAPSULE ORAL EVERY 12 HOURS SCHEDULED
Qty: 14 CAPSULE | Refills: 0 | Status: SHIPPED | OUTPATIENT
Start: 2018-11-27 | End: 2018-11-30 | Stop reason: ALTCHOICE

## 2018-11-27 NOTE — TELEPHONE ENCOUNTER
Wife called  Pt was seen 11/16  Dr Leanne Ridley placed coude Jacobs  Wife changed it to reg Jacobs, pt still c/o discomfort at tip of penis and rectum  Still has leaking around Jacobs, urine malodorous and cloudy  Will go for culture today  Wife will connect fresh bag and obtain specimen to take to [address] [place] [address] lab  Will direct to Dr Leanne Ridley for additional orders

## 2018-11-27 NOTE — PROGRESS NOTES
Patient with malodorous urine and leaking around Jacobs  He has pain at the tip of his penis and rectum  Urine culture will be obtained

## 2018-11-28 NOTE — TELEPHONE ENCOUNTER
Culture done yesterday  Wife questioning if antibiotic called in  States it was sent in yesterday  Wife checked alerts on phone and did receive notification it was ready for   She did not check messages yesterday

## 2018-11-29 LAB — BACTERIA UR CULT: ABNORMAL

## 2018-11-30 ENCOUNTER — TELEPHONE (OUTPATIENT)
Dept: UROLOGY | Facility: MEDICAL CENTER | Age: 83
End: 2018-11-30

## 2018-11-30 DIAGNOSIS — N39.0 URINARY TRACT INFECTION ASSOCIATED WITH INDWELLING URETHRAL CATHETER, SEQUELA: Primary | ICD-10-CM

## 2018-11-30 DIAGNOSIS — T83.511S URINARY TRACT INFECTION ASSOCIATED WITH INDWELLING URETHRAL CATHETER, SEQUELA: Primary | ICD-10-CM

## 2018-11-30 RX ORDER — LEVOFLOXACIN 500 MG/1
500 TABLET, FILM COATED ORAL EVERY 24 HOURS
Qty: 7 TABLET | Refills: 0 | Status: SHIPPED | OUTPATIENT
Start: 2018-11-30 | End: 2018-12-07

## 2018-11-30 NOTE — TELEPHONE ENCOUNTER
----- Message from Samson Garcia MD sent at 11/29/2018  4:40 PM EST -----  Please call the patient regarding his abnormal result  Urine culture is positive  Keflex prescribed may not be effective  If he is not feeling better we will switch his antibiotics to Levaquin tomorrow

## 2018-11-30 NOTE — TELEPHONE ENCOUNTER
Pt is not feeling any better and would like new antibiotics  Will forward to Dr Maura Salmeron to prescribe

## 2019-01-23 ENCOUNTER — TELEPHONE (OUTPATIENT)
Dept: UROLOGY | Facility: MEDICAL CENTER | Age: 84
End: 2019-01-23

## 2019-01-23 NOTE — TELEPHONE ENCOUNTER
Pt's wife called in and stated that patient is leaking around his catheter consistently  Pt was offered appt for today 1/23/19 but unable to come, appt was given for 1/24/19  This is a ongoing situation pt saw Dr Vega Smith in 11/18 and was treated for UTI, wife states that leaking did not stop even then  She denies any crystals or stones on the tip of catheter at changes

## 2019-01-30 NOTE — TELEPHONE ENCOUNTER
Pt's wife was notified that pt needs to be seen to evaluate and CRNP agrees before any medical intervention can be done

## 2019-01-30 NOTE — TELEPHONE ENCOUNTER
Spoke to wife  Pt has been leaking around Awan for weeks  Pt currently on Oxybutynin 15 mg  Wife changes awan and states it's due to be changed  Offered Nurse Visit appt to be changed here declined  Offered appt to be seen in office to be evaluated by BRYCE, declined  States she can not make appt at his time and will call office  Will direct to Berna Islas

## 2019-01-30 NOTE — TELEPHONE ENCOUNTER
I recommend patient have an appointment for re-evaluation and change of SP tube  There is a possibility that we may need to up size depending on the size he currently has

## 2019-01-31 ENCOUNTER — TELEPHONE (OUTPATIENT)
Dept: UROLOGY | Facility: MEDICAL CENTER | Age: 84
End: 2019-01-31

## 2019-01-31 NOTE — TELEPHONE ENCOUNTER
Patient wife called to state that they are having problems with the awan  She states the smell is bad  And patient is always leaking everywhere  Patient states patient's testicles are hard as well    Please advise

## 2019-01-31 NOTE — TELEPHONE ENCOUNTER
Spoke to [facility] pt's wife and instructed to her that we need to see and evaluate pt  She agreed and scheduled appt for 02/01/19 with Dr Mariella Morales

## 2019-02-01 ENCOUNTER — OFFICE VISIT (OUTPATIENT)
Dept: UROLOGY | Facility: MEDICAL CENTER | Age: 84
End: 2019-02-01
Payer: COMMERCIAL

## 2019-02-01 VITALS
WEIGHT: 190 LBS | SYSTOLIC BLOOD PRESSURE: 136 MMHG | HEIGHT: 70 IN | DIASTOLIC BLOOD PRESSURE: 80 MMHG | HEART RATE: 67 BPM | BODY MASS INDEX: 27.2 KG/M2

## 2019-02-01 DIAGNOSIS — N39.41 URGE INCONTINENCE: Primary | ICD-10-CM

## 2019-02-01 DIAGNOSIS — Z85.46 HISTORY OF PROSTATE CANCER: ICD-10-CM

## 2019-02-01 DIAGNOSIS — N30.00 ACUTE CYSTITIS WITHOUT HEMATURIA: ICD-10-CM

## 2019-02-01 DIAGNOSIS — R33.9 URINARY RETENTION WITH INCOMPLETE BLADDER EMPTYING: ICD-10-CM

## 2019-02-01 PROCEDURE — 87186 SC STD MICRODIL/AGAR DIL: CPT | Performed by: UROLOGY

## 2019-02-01 PROCEDURE — 87077 CULTURE AEROBIC IDENTIFY: CPT | Performed by: UROLOGY

## 2019-02-01 PROCEDURE — 87086 URINE CULTURE/COLONY COUNT: CPT | Performed by: UROLOGY

## 2019-02-01 PROCEDURE — 99215 OFFICE O/P EST HI 40 MIN: CPT | Performed by: UROLOGY

## 2019-02-03 LAB — BACTERIA UR CULT: ABNORMAL

## 2019-02-04 ENCOUNTER — TELEPHONE (OUTPATIENT)
Dept: UROLOGY | Facility: MEDICAL CENTER | Age: 84
End: 2019-02-04

## 2019-02-11 ENCOUNTER — ANESTHESIA EVENT (OUTPATIENT)
Dept: PERIOP | Facility: HOSPITAL | Age: 84
End: 2019-02-11
Payer: COMMERCIAL

## 2019-02-12 ENCOUNTER — TELEPHONE (OUTPATIENT)
Dept: UROLOGY | Facility: MEDICAL CENTER | Age: 84
End: 2019-02-12

## 2019-02-12 DIAGNOSIS — N30.00 ACUTE CYSTITIS WITHOUT HEMATURIA: Primary | ICD-10-CM

## 2019-02-12 RX ORDER — CEPHALEXIN 500 MG/1
500 CAPSULE ORAL EVERY 12 HOURS SCHEDULED
Qty: 40 CAPSULE | Refills: 0 | Status: SHIPPED | OUTPATIENT
Start: 2019-02-12 | End: 2019-03-04

## 2019-02-12 NOTE — TELEPHONE ENCOUNTER
Spoke to wife, informed her of medication change  Wife repeated instructions and expressed understanding

## 2019-02-12 NOTE — PROGRESS NOTES
Assessment/Plan:  1  Culture urine and a possible treat before a procedure    2  Discussed option, Botox with overall 75% success rate, the benefits are temporary, 6-12 months  With a long-term Jacobs, is unclear how much he will help him, the goal is to make him leak lasts around the Jacosb catheter  Potential complications discussed, patient wife consent  3  Procedure increased risk due to comorbidities of breathing difficulty, hypertension, elevated cholesterol, long-term blood thinner, mild dementia, polypharmacy  No problem-specific Assessment & Plan notes found for this encounter  Diagnoses and all orders for this visit:    Urge incontinence  -     Case request operating room: INJECTION BOTULINUM TOXIN (BOTOX); Standing  -     Case request operating room: INJECTION BOTULINUM TOXIN (BOTOX)  -     Urine culture    Urinary retention with incomplete bladder emptying  -     Case request operating room: INJECTION BOTULINUM TOXIN (BOTOX); Standing  -     Case request operating room: INJECTION BOTULINUM TOXIN (BOTOX)    Acute cystitis without hematuria    History of prostate cancer    Other orders  -     Diet NPO; Sips with meds; Standing  -     Place sequential compression device; Standing          Subjective:      Patient ID: Charline Sullivan is a 80 y o  male  Worsening urgency incontinence, long-term Jacobs, leaks around the catheter, has to wear pads  Changing catheter has not helped  Overactive bladder meds have not    Prior notes have shown open prostate after TURP long ago  Urine cultures positive, deeper for UTI does not seem to change the degree incontinence  The following portions of the patient's history were reviewed and updated as appropriate: allergies, current medications, past family history, past medical history, past social history, past surgical history and problem list     Review of Systems   All other systems reviewed and are negative          Objective:      /80 (BP Location: Left arm, Patient Position: Sitting, Cuff Size: Adult)   Pulse 67   Ht 5' 10" (1 778 m)   Wt 86 2 kg (190 lb)   BMI 27 26 kg/m²          Physical Exam   Constitutional: He is oriented to person, place, and time  He appears well-developed and well-nourished  No distress  Numerous physical problems   HENT:   Head: Normocephalic and atraumatic  Eyes: Conjunctivae are normal    Cardiovascular: Normal rate and regular rhythm  Pulmonary/Chest: Effort normal and breath sounds normal  No respiratory distress  He has no wheezes  Abdominal: Soft  Bowel sounds are normal  He exhibits no distension and no mass  There is no tenderness  Genitourinary:   Genitourinary Comments: Penis and testes normal, some atrophy   Neurological: He is alert and oriented to person, place, and time  Skin: Skin is warm and dry  He is not diaphoretic

## 2019-02-12 NOTE — TELEPHONE ENCOUNTER
----- Message from Sasha Sparks MD sent at 2/12/2019  7:10 AM EST -----  Tell patient not to take Cipro I gave them before surgery, the week after next  Instead, start taking Keflex twice per day tomorrow, I gave him enough through time of surgery and a few days afterwards

## 2019-02-12 NOTE — PROGRESS NOTES
Culture shows Proteus, not sensitive to Cipro, switched to cephalexin 500 b i d , start now, through time of surgery in two weeks

## 2019-02-14 ENCOUNTER — APPOINTMENT (OUTPATIENT)
Dept: LAB | Facility: HOSPITAL | Age: 84
End: 2019-02-14
Attending: UROLOGY
Payer: COMMERCIAL

## 2019-02-14 ENCOUNTER — APPOINTMENT (OUTPATIENT)
Dept: PREADMISSION TESTING | Facility: HOSPITAL | Age: 84
End: 2019-02-14
Payer: COMMERCIAL

## 2019-02-14 ENCOUNTER — HOSPITAL ENCOUNTER (OUTPATIENT)
Dept: NON INVASIVE DIAGNOSTICS | Facility: HOSPITAL | Age: 84
Discharge: HOME | End: 2019-02-14
Attending: UROLOGY
Payer: COMMERCIAL

## 2019-02-14 DIAGNOSIS — N39.41 URGE INCONTINENCE: ICD-10-CM

## 2019-02-14 DIAGNOSIS — N32.81 OVERACTIVE BLADDER: ICD-10-CM

## 2019-02-14 LAB
ALBUMIN SERPL BCP-MCNC: 3.7 G/DL (ref 3.5–5)
ALP SERPL-CCNC: 96 U/L (ref 46–116)
ALT SERPL W P-5'-P-CCNC: 15 U/L (ref 12–78)
ANION GAP SERPL CALCULATED.3IONS-SCNC: 9 MMOL/L (ref 4–13)
AST SERPL W P-5'-P-CCNC: 22 U/L (ref 5–45)
BASOPHILS # BLD AUTO: 0.05 THOUSANDS/ΜL (ref 0–0.1)
BASOPHILS NFR BLD AUTO: 1 % (ref 0–1)
BILIRUB SERPL-MCNC: 0.79 MG/DL (ref 0.2–1)
BUN SERPL-MCNC: 21 MG/DL (ref 5–25)
CALCIUM SERPL-MCNC: 10 MG/DL (ref 8.3–10.1)
CHLORIDE SERPL-SCNC: 108 MMOL/L (ref 100–108)
CO2 SERPL-SCNC: 28 MMOL/L (ref 21–32)
CREAT SERPL-MCNC: 1.09 MG/DL (ref 0.6–1.3)
EOSINOPHIL # BLD AUTO: 0.1 THOUSAND/ΜL (ref 0–0.61)
EOSINOPHIL NFR BLD AUTO: 1 % (ref 0–6)
ERYTHROCYTE [DISTWIDTH] IN BLOOD BY AUTOMATED COUNT: 14.2 % (ref 11.6–15.1)
GFR SERPL CREATININE-BSD FRML MDRD: 62 ML/MIN/1.73SQ M
GLUCOSE SERPL-MCNC: 97 MG/DL (ref 65–140)
HCT VFR BLD AUTO: 46.7 % (ref 36.5–49.3)
HGB BLD-MCNC: 14.9 G/DL (ref 12–17)
IMM GRANULOCYTES # BLD AUTO: 0.02 THOUSAND/UL (ref 0–0.2)
IMM GRANULOCYTES NFR BLD AUTO: 0 % (ref 0–2)
LYMPHOCYTES # BLD AUTO: 0.99 THOUSANDS/ΜL (ref 0.6–4.47)
LYMPHOCYTES NFR BLD AUTO: 14 % (ref 14–44)
MCH RBC QN AUTO: 30 PG (ref 26.8–34.3)
MCHC RBC AUTO-ENTMCNC: 31.9 G/DL (ref 31.4–37.4)
MCV RBC AUTO: 94 FL (ref 82–98)
MONOCYTES # BLD AUTO: 0.59 THOUSAND/ΜL (ref 0.17–1.22)
MONOCYTES NFR BLD AUTO: 8 % (ref 4–12)
NEUTROPHILS # BLD AUTO: 5.49 THOUSANDS/ΜL (ref 1.85–7.62)
NEUTS SEG NFR BLD AUTO: 76 % (ref 43–75)
NRBC BLD AUTO-RTO: 0 /100 WBCS
PLATELET # BLD AUTO: 223 THOUSANDS/UL (ref 149–390)
PMV BLD AUTO: 8.8 FL (ref 8.9–12.7)
POTASSIUM SERPL-SCNC: 4.2 MMOL/L (ref 3.5–5.3)
PROT SERPL-MCNC: 7.1 G/DL (ref 6.4–8.2)
QRS AXIS: -59 DEGREES
QRSD INTERVAL: 148 MS
QT INTERVAL: 386 MS
QTC INTERVAL: 482 MS
RBC # BLD AUTO: 4.97 MILLION/UL (ref 3.88–5.62)
SODIUM SERPL-SCNC: 145 MMOL/L (ref 136–145)
T WAVE AXIS: 5 DEGREES
VENTRICULAR RATE: 94 BPM
WBC # BLD AUTO: 7.24 THOUSAND/UL (ref 4.31–10.16)

## 2019-02-14 PROCEDURE — 80053 COMPREHEN METABOLIC PANEL: CPT

## 2019-02-14 PROCEDURE — 93010 ELECTROCARDIOGRAM REPORT: CPT | Performed by: INTERNAL MEDICINE

## 2019-02-14 PROCEDURE — 85025 COMPLETE CBC W/AUTO DIFF WBC: CPT

## 2019-02-14 PROCEDURE — 36415 COLL VENOUS BLD VENIPUNCTURE: CPT

## 2019-02-14 PROCEDURE — 93005 ELECTROCARDIOGRAM TRACING: CPT

## 2019-02-14 RX ORDER — WARFARIN SODIUM 7.5 MG/1
TABLET ORAL
Status: ON HOLD | COMMUNITY
End: 2019-02-26 | Stop reason: ALTCHOICE

## 2019-02-14 NOTE — ANESTHESIA PREPROCEDURE EVALUATION
Review of Systems/Medical History  Patient summary reviewed  Chart reviewed  No history of anesthetic complications     Cardiovascular  Hyperlipidemia, Hypertension controlled, Dysrhythmias , atrial fibrillation,    Pulmonary  Smoker ex-smoker  , COPD , Sleep apnea ,        GI/Hepatic  Negative GI/hepatic ROS          Prostatic disorder, history of prostate cancer       Endo/Other     GYN       Hematology    Coagulation disorder (LD Pradaxa 2 days ago) currently taking oral anticoagulants,    Musculoskeletal  Rheumatoid arthritis Severity: moderate, Back pain , lumbar pain,   Arthritis     Neurology    CVA ("Questionable") , no residual symptoms,   Comment: Mild dementia Psychology   Negative psychology ROS              Physical Exam    Airway    Mallampati score: II  TM Distance: >3 FB  Neck ROM: full     Dental   Comment: "Dental appliance" on bottom,     Cardiovascular  Rhythm: irregular, Rate: normal, Murmur,     Pulmonary  Pulmonary exam normal Breath sounds clear to auscultation,     Other Findings        Anesthesia Plan  ASA Score- 3     Anesthesia Type- general and regional with ASA Monitors  Additional Monitors:   Airway Plan: ETT  Plan Factors-Patient not instructed to abstain from smoking on day of procedure  Patient did not smoke on day of surgery  Induction- intravenous  Postoperative Plan- Plan for postoperative opioid use  Informed Consent- Anesthetic plan and risks discussed with patient

## 2019-02-14 NOTE — PRE-PROCEDURE INSTRUCTIONS
Pre-Surgery Instructions:   Medication Instructions    albuterol (PROVENTIL HFA,VENTOLIN HFA) 90 mcg/act inhaler Patient was instructed by Physician and understands   atenolol (TENORMIN) 25 mg tablet Patient was instructed by Physician and understands   atorvastatin (LIPITOR) 40 mg tablet Patient was instructed by Physician and understands   budesonide-formoterol (SYMBICORT) 160-4 5 mcg/act inhaler Patient was instructed by Physician and understands   diltiazem (CARDIZEM) 120 MG tablet Patient was instructed by Physician and understands   donepezil (ARICEPT) 10 mg tablet Patient was instructed by Physician and understands   furosemide (LASIX) 20 mg tablet Patient was instructed by Physician and understands   lisinopril (ZESTRIL) 40 mg tablet Patient was instructed by Physician and understands   mirtazapine (REMERON) 30 mg tablet Patient was instructed by Physician and understands   Multiple Vitamin (MULTIVITAMIN) capsule Patient was instructed by Physician and understands   potassium chloride (MICRO-K) 10 MEQ CR capsule Patient was instructed by Physician and understands   traMADol (ULTRAM) 50 mg tablet Patient was instructed by Physician and understands   warfarin (COUMADIN) 7 5 mg tablet Patient was instructed by Physician and understands  Seen by dr Nick Torres  Instructed to take Atenolol and Diltiazem with sip of water morning of surgery  Stop Aspirin, NSAIDs, vitamins, and supplements 7 days before surgery    Told to get instructions re: stopping the Coumadin from his physician

## 2019-02-22 ENCOUNTER — TELEPHONE (OUTPATIENT)
Dept: UROLOGY | Facility: MEDICAL CENTER | Age: 84
End: 2019-02-22

## 2019-02-22 NOTE — H&P
HISTORY AND PHYSICAL  ? ? Patient Name: Jarrell Collet  Patient MRN: [id number]  Attending Provider: Meghna Kelly MD  Service: Urology  Chief Complaint    Urinary retention, overactive bladder  HPI   Jarrell Collet is a 80 y o  male with  long-term Jacobs for retention and incontinence  He leaks around the catheter despite anticholinergics and other overactive bladder medicines  I plan  cystoscopy, Botox injection  He knows our goal is to pharmacologically paralyzed  the bladder to reduce leakage around the catheter  He will need the catheter long-term, he understands that and agrees  Potential risks and complications discussed, and informed consent was given by the patient  Medications  Meds/Allergies     No current facility-administered medications for this encounter  Cannot display prior to admission medications because the patient has not been admitted in this contact  No current facility-administered medications for this encounter  Current Outpatient Medications:     albuterol (PROVENTIL HFA,VENTOLIN HFA) 90 mcg/act inhaler, Inhale 2 puffs every 6 (six) hours as needed for wheezing, Disp: , Rfl:     atenolol (TENORMIN) 25 mg tablet, Take 50 mg by mouth daily , Disp: , Rfl:     atorvastatin (LIPITOR) 40 mg tablet, Take 40 mg by mouth daily, Disp: , Rfl:     budesonide-formoterol (SYMBICORT) 160-4 5 mcg/act inhaler, INHALE 2 PUFFS TWICE DAILY   RINSE AND SPIT MOUTH AFTER USE, Disp: , Rfl:     diltiazem (CARDIZEM) 120 MG tablet, Take 120 mg by mouth daily, Disp: , Rfl:     donepezil (ARICEPT) 10 mg tablet, Take 10 mg by mouth daily, Disp: , Rfl:     furosemide (LASIX) 20 mg tablet, Take 20 mg by mouth daily, Disp: , Rfl:     lisinopril (ZESTRIL) 40 mg tablet, Take 40 mg by mouth daily, Disp: , Rfl:     mirtazapine (REMERON) 30 mg tablet, Take 30 mg by mouth daily at bedtime , Disp: , Rfl:     Multiple Vitamin (MULTIVITAMIN) capsule, Take 1 capsule by mouth daily, Disp: , Rfl:     potassium chloride (MICRO-K) 10 MEQ CR capsule, Take 10 mEq by mouth 2 (two) times a day, Disp: , Rfl:     traMADol (ULTRAM) 50 mg tablet, Take 50 mg by mouth 2 (two) times a day as needed for moderate pain, Disp: , Rfl:     warfarin (COUMADIN) 7 5 mg tablet, Take by mouth daily, Disp: , Rfl:     amoxicillin (AMOXIL) 500 MG tablet, Take 500 mg by mouth daily as needed Only before dental procedures, Disp: , Rfl:     cephalexin (KEFLEX) 500 mg capsule, Take 1 capsule (500 mg total) by mouth every 12 (twelve) hours for 20 days, Disp: 40 capsule, Rfl: 0  Review of Systems  10 point review of systems negative except as noted in HPI  Allergies  No Known Allergies  PMH  Past Medical History:   Diagnosis Date    A-fib [facility]     Atrial fibrillation ([facility] )     [facility]     CPAP (continuous positive airway pressure) dependence     CVA (cerebral vascular accident) ([facility] )     possible but unsure when it was, was seen on MRI    Hyperlipidemia     Hypertension     Malignant neoplasm of prostate ([facility] )     Other neuromuscular dysfunction of bladder     Overactive bladder     PAC (premature atrial contraction)     Prostate cancer (HCC)     Prostatitis     RA (rheumatoid arthritis) ([facility]     Rheumatoid arthritis ([facility] [address] [facility]     Sleep apnea     Urge incontinence     Urinary retention     Wears partial dentures     lower metal hardware     Past surgical history  Past Surgical History:   Procedure Laterality Date    CATARACT EXTRACTION      FRACTURE SURGERY      left femur    JOINT REPLACEMENT      shoulder left    CA OPEN TREATMENT BIMALLEOLAR ANKLE FRACTURE Right 12/29/2017    Procedure: Open Reduction Internal Fixation Bimalleolar fracture right lower leg using plates and screws;  Surgeon: Nelson Javier DPM;  Location: [facility];  Service: Podiatry    PROSTATECTOMY      SHOULDER SURGERY       Social history  Social History     Tobacco Use  Smoking status: Former Smoker    Smokeless tobacco: Never Used    Tobacco comment: stopped 30 years ago    Substance Use Topics    Alcohol use: No    Drug use: No     ?  Physical Exam  General appearance: Debilitated  Head: Normocephalic, without obvious abnormality, atraumatic  Neck: no adenopathy, no carotid bruit, no JVD, supple, symmetrical, trachea midline and thyroid not enlarged, symmetric, no tenderness/mass/nodules  Back: symmetric, no curvature  ROM normal  No CVA tenderness    Lungs: clear to auscultation bilaterally  Chest wall: no tenderness  Heart: regular rate and rhythm, S1, S2 normal, no murmur, click, rub or gallop  Abdomen: soft, non-tender; bowel sounds normal; no masses,  no organomegaly  Male genitalia: normal, Jacobs in place  Extremities: extremities normal, warm and well-perfused; no cyanosis, clubbing, or edema  Neurologic: Grossly normal  Rebeka De Leon MD

## 2019-02-22 NOTE — TELEPHONE ENCOUNTER
HCG Clearance received and faxed to Julio Cesar, per [facility] [place] @ [facility] patient has been instructed by their office to hold his Pradaxa 2 days prior to the procedure

## 2019-02-25 ENCOUNTER — TELEPHONE (OUTPATIENT)
Dept: UROLOGY | Facility: MEDICAL CENTER | Age: 84
End: 2019-02-25

## 2019-02-25 NOTE — TELEPHONE ENCOUNTER
Patient wife would like to know what type of Anesthesia would be used on tomorrow's procedure  Please advise

## 2019-02-26 ENCOUNTER — ANESTHESIA (OUTPATIENT)
Dept: PERIOP | Facility: HOSPITAL | Age: 84
End: 2019-02-26
Payer: COMMERCIAL

## 2019-02-26 ENCOUNTER — HOSPITAL ENCOUNTER (OUTPATIENT)
Facility: HOSPITAL | Age: 84
Setting detail: OUTPATIENT SURGERY
Discharge: HOME | End: 2019-02-26
Attending: UROLOGY | Admitting: UROLOGY
Payer: COMMERCIAL

## 2019-02-26 ENCOUNTER — TELEPHONE (OUTPATIENT)
Dept: UROLOGY | Facility: MEDICAL CENTER | Age: 84
End: 2019-02-26

## 2019-02-26 VITALS
DIASTOLIC BLOOD PRESSURE: 84 MMHG | HEART RATE: 67 BPM | BODY MASS INDEX: 27.83 KG/M2 | TEMPERATURE: 98.1 F | OXYGEN SATURATION: 94 % | WEIGHT: 194.4 LBS | SYSTOLIC BLOOD PRESSURE: 182 MMHG | HEIGHT: 70 IN | RESPIRATION RATE: 16 BRPM

## 2019-02-26 DIAGNOSIS — N32.89 BLADDER SPASMS: Primary | ICD-10-CM

## 2019-02-26 PROBLEM — N99.114 POSTPROCEDURAL ANTERIOR URETHRAL STRICTURE: Status: ACTIVE | Noted: 2019-02-26

## 2019-02-26 PROCEDURE — 52287 CYSTOSCOPY CHEMODENERVATION: CPT | Performed by: UROLOGY

## 2019-02-26 RX ORDER — DABIGATRAN ETEXILATE 150 MG/1
150 CAPSULE, COATED PELLETS ORAL 2 TIMES DAILY
COMMUNITY
End: 2020-04-03 | Stop reason: ALTCHOICE

## 2019-02-26 RX ORDER — SODIUM CHLORIDE 9 MG/ML
125 INJECTION, SOLUTION INTRAVENOUS CONTINUOUS
Status: DISCONTINUED | OUTPATIENT
Start: 2019-02-26 | End: 2019-02-26 | Stop reason: HOSPADM

## 2019-02-26 RX ORDER — MAGNESIUM HYDROXIDE 1200 MG/15ML
LIQUID ORAL AS NEEDED
Status: DISCONTINUED | OUTPATIENT
Start: 2019-02-26 | End: 2019-02-26 | Stop reason: HOSPADM

## 2019-02-26 RX ORDER — ONDANSETRON 2 MG/ML
INJECTION INTRAMUSCULAR; INTRAVENOUS AS NEEDED
Status: DISCONTINUED | OUTPATIENT
Start: 2019-02-26 | End: 2019-02-26 | Stop reason: SURG

## 2019-02-26 RX ORDER — FENTANYL CITRATE 50 UG/ML
INJECTION, SOLUTION INTRAMUSCULAR; INTRAVENOUS AS NEEDED
Status: DISCONTINUED | OUTPATIENT
Start: 2019-02-26 | End: 2019-02-26 | Stop reason: SURG

## 2019-02-26 RX ORDER — OXYCODONE HYDROCHLORIDE AND ACETAMINOPHEN 5; 325 MG/1; MG/1
1 TABLET ORAL EVERY 4 HOURS PRN
Status: DISCONTINUED | OUTPATIENT
Start: 2019-02-26 | End: 2019-02-26 | Stop reason: HOSPADM

## 2019-02-26 RX ORDER — OXYCODONE HYDROCHLORIDE AND ACETAMINOPHEN 5; 325 MG/1; MG/1
2 TABLET ORAL EVERY 4 HOURS PRN
Status: DISCONTINUED | OUTPATIENT
Start: 2019-02-26 | End: 2019-02-26 | Stop reason: HOSPADM

## 2019-02-26 RX ORDER — PROPOFOL 10 MG/ML
INJECTION, EMULSION INTRAVENOUS AS NEEDED
Status: DISCONTINUED | OUTPATIENT
Start: 2019-02-26 | End: 2019-02-26 | Stop reason: SURG

## 2019-02-26 RX ADMIN — ONDANSETRON 4 MG: 2 INJECTION INTRAMUSCULAR; INTRAVENOUS at 07:40

## 2019-02-26 RX ADMIN — PROPOFOL 150 MG: 10 INJECTION, EMULSION INTRAVENOUS at 07:40

## 2019-02-26 RX ADMIN — OXYCODONE HYDROCHLORIDE AND ACETAMINOPHEN 1 TABLET: 5; 325 TABLET ORAL at 09:21

## 2019-02-26 RX ADMIN — LIDOCAINE HYDROCHLORIDE 80 MG: 20 INJECTION, SOLUTION INTRAVENOUS at 07:40

## 2019-02-26 RX ADMIN — SODIUM CHLORIDE 125 ML/HR: 0.9 INJECTION, SOLUTION INTRAVENOUS at 06:42

## 2019-02-26 RX ADMIN — SODIUM CHLORIDE: 0.9 INJECTION, SOLUTION INTRAVENOUS at 08:09

## 2019-02-26 RX ADMIN — FENTANYL CITRATE 25 MCG: 50 INJECTION, SOLUTION INTRAMUSCULAR; INTRAVENOUS at 07:50

## 2019-02-26 RX ADMIN — CEFTRIAXONE SODIUM 1000 MG: 10 INJECTION, POWDER, FOR SOLUTION INTRAVENOUS at 06:42

## 2019-02-26 NOTE — OP NOTE
OPERATIVE REPORT  PATIENT NAME: Jacklyn Denver    :  1935  MRN: [id number]  Pt Location: [facility] ROOM 04    SURGERY DATE: 2019    Surgeon(s) and Role:     * Gerry Evans MD - Primary    Preop Diagnosis:  Urge incontinence [N39 41]  Urinary retention with incomplete bladder emptying [R33 9]    Post-Op Diagnosis Codes:     * Urge incontinence [N39 41]     * Urinary retention with incomplete bladder emptying [R33 9]  Urethral stricture post procedural penile urethra  Procedure(s) (LRB):  INJECTION BOTULINUM TOXIN (BOTOX), URETHRAL DILATION (N/A)    Specimen(s):  * No specimens in log *    Estimated Blood Loss:   Minimal    Drains:  Urethral Catheter Double-lumen; Latex;Coude (Active)   Number of days: 0       Anesthesia Type:   General/LMA    Operative Indications:  Urge incontinence [N39 41]  Urinary retention with incomplete bladder emptying [R33 9]  Chronic awan, leakage around awan due to bladder spasms    Operative Findings:  Tight penile urethral stricture    Complications:   None    Procedure and Technique:      After the patient was placed under adequate general anesthesia, he was prepped and draped using Betadine solution in lithotomy position  The 25 [place] scope was passed without difficulty in the urethra which had no strictures  The prostate had minimal hyperplasia  The bladder was inspected and found to have moderate muscular trabeculations  The bladder was flushed out to remove any residual contaminated urine  The Botox was then injected  100 units Botox had been in mixed in 30 mL sterile saline by pharmacy  I injected 1 mL in 30 different locations on the posterior wall  This was in a grid 6 x 5  Minimal bleeding was encountered  Care was taken to not inject around the trigone or the orifice sees  After thorough injection of Botox, scope was removed, Awan catheter inserted, patient taken to recovery in good condition         I was present for the entire procedure    Patient Disposition:  PACU     SIGNATURE: Charla Griffith MD  DATE: February 26, 2019  TIME: 8:27 AM

## 2019-02-26 NOTE — DISCHARGE INSTRUCTIONS
ALL YOUR  PREVIOUS MEDS ARE THE SAME  START BLOOD THINNER NOW, ALSO    NEW MEDS:  NONE    EXPECT SOME BLOOD IN URINE, BURNING, FREQUENT URINATION

## 2019-02-26 NOTE — TELEPHONE ENCOUNTER
Patient is doing fine now ,he took some Naproxen - over the counter pain med per wife  His catheter is draining well/pink urine  He just had this procedure this AM He has no other problems no fever and is drinking ok  His wife will call if any problems and call tomorrow to report also                  LUKE

## 2019-02-26 NOTE — ANESTHESIA POSTPROCEDURE EVALUATION
Post-Op Assessment Note    CV Status:  Stable  Pain Score: 2    Pain management: adequate     Mental Status:  Alert and awake   Hydration Status:  Euvolemic and stable   PONV Controlled:  Controlled   Airway Patency:  Patent   Post Op Vitals Reviewed: Yes      Staff: Anesthesiologist           BP     Temp      Pulse     Resp      SpO2

## 2019-02-26 NOTE — DISCHARGE SUMMARY
Discharge Summary - Lan Ortega 80 y o  male MRN: [id number]    Admission Date: 2/26/2019     Admitting Diagnosis: Urge incontinence [N39 41]  Urinary retention with incomplete bladder emptying [R33 9]    Procedures Performed: cysto, botox    Patient underwent planned outpt surgery and recovered without complication  Discharged in good condition  Medications were prescribed  Pt knows to call the office for followup in one month

## 2019-02-27 ENCOUNTER — TELEPHONE (OUTPATIENT)
Dept: UROLOGY | Facility: MEDICAL CENTER | Age: 84
End: 2019-02-27

## 2019-02-27 DIAGNOSIS — K59.4 RECTAL SPASM: Primary | ICD-10-CM

## 2019-02-27 RX ORDER — DICYCLOMINE HCL 20 MG
20 TABLET ORAL EVERY 8 HOURS PRN
Qty: 10 TABLET | Refills: 0 | Status: SHIPPED | OUTPATIENT
Start: 2019-02-27 | End: 2019-03-27

## 2019-02-27 RX ORDER — OXYBUTYNIN CHLORIDE 5 MG/1
5 TABLET ORAL 3 TIMES DAILY PRN
Qty: 30 TABLET | Refills: 1 | Status: SHIPPED | OUTPATIENT
Start: 2019-02-27 | End: 2019-03-27

## 2019-02-27 NOTE — TELEPHONE ENCOUNTER
Spoke to wife, pt having rectal spasms which radiates to the penis  Feels as if he has to defecate  Wife states Jacobs draining fine, urine light pink  Does not leak around Jacobs during these episode  Naproxen is ineffective for controlling pain with these spasms  Will direct to Dr Monika Osborn

## 2019-02-27 NOTE — TELEPHONE ENCOUNTER
Dr Kenny Lynne ordered Bentyl, called wife and told her not to  prescription sent in today for Oxybutynin  Stated they already tried Bentyl and it seems to be helping

## 2019-03-27 ENCOUNTER — OFFICE VISIT (OUTPATIENT)
Dept: UROLOGY | Facility: MEDICAL CENTER | Age: 84
End: 2019-03-27
Payer: COMMERCIAL

## 2019-03-27 VITALS
HEART RATE: 75 BPM | DIASTOLIC BLOOD PRESSURE: 84 MMHG | SYSTOLIC BLOOD PRESSURE: 136 MMHG | WEIGHT: 191 LBS | BODY MASS INDEX: 27.35 KG/M2 | HEIGHT: 70 IN

## 2019-03-27 DIAGNOSIS — N39.41 URGE INCONTINENCE: Primary | ICD-10-CM

## 2019-03-27 PROCEDURE — 99213 OFFICE O/P EST LOW 20 MIN: CPT | Performed by: UROLOGY

## 2019-03-27 NOTE — PROGRESS NOTES
Assessment/Plan:   Good result from Botox  I discussed the fact that this is temporary, it last between six in 18 months  Wife changes the catheter every month  Follow-up in one year or sooner for any problems  His PSA has remained close to 0 now seven years out from radiation therapy for prostate cancer  There are no diagnoses linked to this encounter  Subjective:     Patient ID: Brandy Tim is a 80 y o  male  Follow-up for Botox injection 5-6 weeks ago  This is done for leakage of urine around his chronic Jacobs catheter  Terrible spasms also especially in the immediate postop  However, spasm relief, not leaking around the catheter, quite happy with results  Review of Systems   All other systems reviewed and are negative  Objective:     Physical Exam   Constitutional: He appears well-developed and well-nourished

## 2020-01-23 ENCOUNTER — TELEPHONE (OUTPATIENT)
Dept: UROLOGY | Facility: MEDICAL CENTER | Age: 85
End: 2020-01-23

## 2020-01-23 NOTE — TELEPHONE ENCOUNTER
Spoke with the patient's wife; The patient is leaking urine again - they're unable to wait until his yearly appointment on 04/03/2020 to discuss Botox and would like to be seen sooner   Patient is now scheduled for 01/27/2020 at 11:00 AM  She's pleased and has no questions or concerns to be addressed at this time

## 2020-01-23 NOTE — TELEPHONE ENCOUNTER
Patient of Dr Renny Fagan seen in the [facility] office  Patient wife called in and advise that the patient is starting to leak urine again  Patient wife advised that she thinks it is time for him to get Botox again and she would like him seen sooner than 4/3/20  Please advise

## 2020-01-27 ENCOUNTER — OFFICE VISIT (OUTPATIENT)
Dept: UROLOGY | Facility: MEDICAL CENTER | Age: 85
End: 2020-01-27
Payer: COMMERCIAL

## 2020-01-27 ENCOUNTER — TELEPHONE (OUTPATIENT)
Dept: UROLOGY | Facility: MEDICAL CENTER | Age: 85
End: 2020-01-27

## 2020-01-27 VITALS
HEIGHT: 70 IN | BODY MASS INDEX: 28.2 KG/M2 | HEART RATE: 63 BPM | DIASTOLIC BLOOD PRESSURE: 84 MMHG | SYSTOLIC BLOOD PRESSURE: 136 MMHG | WEIGHT: 197 LBS

## 2020-01-27 DIAGNOSIS — N32.81 OVERACTIVE BLADDER: Primary | ICD-10-CM

## 2020-01-27 DIAGNOSIS — C61 ADENOCARCINOMA OF PROSTATE (HCC): ICD-10-CM

## 2020-01-27 PROCEDURE — 99214 OFFICE O/P EST MOD 30 MIN: CPT | Performed by: UROLOGY

## 2020-01-27 RX ORDER — CEPHALEXIN 500 MG/1
500 CAPSULE ORAL EVERY 8 HOURS SCHEDULED
Qty: 15 CAPSULE | Refills: 0 | Status: SHIPPED | OUTPATIENT
Start: 2020-01-27 | End: 2020-02-01

## 2020-01-27 NOTE — TELEPHONE ENCOUNTER
Patient's wife will be calling with correct insurance information  They have [address] [facility] just for this month and they did not have the card and information was not in computer  Please enter the information when she calls

## 2020-01-27 NOTE — TELEPHONE ENCOUNTER
Jack Amezcua [employer]  Member number [id number]  Health Plan Number [id number]  Group Number [id number]  Payer ID Number [id number]  [phone]  Customer service phone number is [phone]

## 2020-01-28 ENCOUNTER — TELEPHONE (OUTPATIENT)
Dept: UROLOGY | Facility: MEDICAL CENTER | Age: 85
End: 2020-01-28

## 2020-01-28 NOTE — TELEPHONE ENCOUNTER
Clinical, patient was seen 1/27/2020 with Dr Chely Price  Patients med list states patient is on Pradaxa and Eliquis, patient is only on Eliquis  Please update and check that the rest of his medication list up to date  Per patients wife the pradaxa DC was verbalized in the MA yesterday

## 2020-01-28 NOTE — TELEPHONE ENCOUNTER
I spoke to the patients wife and scheduled his procedure with Dr Suyapa De La Fuente 2/18/2020 at [facility]  -instructions given verbally and mailed  -patient will have CBC, CMP, Urine C&S and EKG 7-10 days prior  -patient will stop his Eliquis and any other potentially blood thinning meds 2 days prior  -No Clearances ordered  -AUTH #[id number] (2/4/2020 - 2/4/2021 3 DOS) IS FOR CURRENT INSURANCE CAP BC SENIOR BLUE EFFECTIVE 2/1/2020 BS#[id number]   PATIENT WILL BRING NEW INSURANCE CARDS TO SCAN AT 2/18/2020 PROCEDURE

## 2020-01-29 NOTE — TELEPHONE ENCOUNTER
The Pradaxa is noted as not taking in the computer, so not sure where the confusion is  Spoke with wife and clarified medication list is correct

## 2020-02-06 ENCOUNTER — APPOINTMENT (OUTPATIENT)
Dept: LAB | Facility: HOSPITAL | Age: 85
End: 2020-02-06
Attending: UROLOGY
Payer: COMMERCIAL

## 2020-02-06 ENCOUNTER — HOSPITAL ENCOUNTER (OUTPATIENT)
Dept: NON INVASIVE DIAGNOSTICS | Facility: HOSPITAL | Age: 85
Discharge: HOME | End: 2020-02-06
Attending: UROLOGY
Payer: COMMERCIAL

## 2020-02-06 DIAGNOSIS — N32.81 OVERACTIVE BLADDER: ICD-10-CM

## 2020-02-06 LAB
ALBUMIN SERPL BCP-MCNC: 3.5 G/DL (ref 3.5–5)
ALP SERPL-CCNC: 75 U/L (ref 46–116)
ALT SERPL W P-5'-P-CCNC: 16 U/L (ref 12–78)
ANION GAP SERPL CALCULATED.3IONS-SCNC: 6 MMOL/L (ref 4–13)
APTT PPP: 34 SECONDS (ref 23–37)
AST SERPL W P-5'-P-CCNC: 18 U/L (ref 5–45)
ATRIAL RATE: 58 BPM
BASOPHILS # BLD AUTO: 0.04 THOUSANDS/ΜL (ref 0–0.1)
BASOPHILS NFR BLD AUTO: 0 % (ref 0–1)
BILIRUB SERPL-MCNC: 0.64 MG/DL (ref 0.2–1)
BUN SERPL-MCNC: 21 MG/DL (ref 5–25)
CALCIUM ALBUM COR SERPL-MCNC: 10.7 MG/DL (ref 8.3–10.1)
CALCIUM SERPL-MCNC: 10.3 MG/DL (ref 8.3–10.1)
CHLORIDE SERPL-SCNC: 107 MMOL/L (ref 100–108)
CO2 SERPL-SCNC: 29 MMOL/L (ref 21–32)
CREAT SERPL-MCNC: 1.21 MG/DL (ref 0.6–1.3)
EOSINOPHIL # BLD AUTO: 0.16 THOUSAND/ΜL (ref 0–0.61)
EOSINOPHIL NFR BLD AUTO: 2 % (ref 0–6)
ERYTHROCYTE [DISTWIDTH] IN BLOOD BY AUTOMATED COUNT: 14.4 % (ref 11.6–15.1)
GFR SERPL CREATININE-BSD FRML MDRD: 55 ML/MIN/1.73SQ M
GLUCOSE SERPL-MCNC: 126 MG/DL (ref 65–140)
HCT VFR BLD AUTO: 45.9 % (ref 36.5–49.3)
HGB BLD-MCNC: 14 G/DL (ref 12–17)
IMM GRANULOCYTES # BLD AUTO: 0.04 THOUSAND/UL (ref 0–0.2)
IMM GRANULOCYTES NFR BLD AUTO: 0 % (ref 0–2)
INR PPP: 1.23 (ref 0.84–1.19)
LYMPHOCYTES # BLD AUTO: 1.41 THOUSANDS/ΜL (ref 0.6–4.47)
LYMPHOCYTES NFR BLD AUTO: 15 % (ref 14–44)
MCH RBC QN AUTO: 29.9 PG (ref 26.8–34.3)
MCHC RBC AUTO-ENTMCNC: 30.5 G/DL (ref 31.4–37.4)
MCV RBC AUTO: 98 FL (ref 82–98)
MONOCYTES # BLD AUTO: 0.65 THOUSAND/ΜL (ref 0.17–1.22)
MONOCYTES NFR BLD AUTO: 7 % (ref 4–12)
NEUTROPHILS # BLD AUTO: 7.19 THOUSANDS/ΜL (ref 1.85–7.62)
NEUTS SEG NFR BLD AUTO: 76 % (ref 43–75)
NRBC BLD AUTO-RTO: 0 /100 WBCS
PLATELET # BLD AUTO: 209 THOUSANDS/UL (ref 149–390)
PMV BLD AUTO: 8.4 FL (ref 8.9–12.7)
POTASSIUM SERPL-SCNC: 4 MMOL/L (ref 3.5–5.3)
PROT SERPL-MCNC: 7.2 G/DL (ref 6.4–8.2)
PROTHROMBIN TIME: 15.7 SECONDS (ref 11.6–14.5)
QRS AXIS: -55 DEGREES
QRSD INTERVAL: 170 MS
QT INTERVAL: 418 MS
QTC INTERVAL: 491 MS
RBC # BLD AUTO: 4.68 MILLION/UL (ref 3.88–5.62)
SODIUM SERPL-SCNC: 142 MMOL/L (ref 136–145)
T WAVE AXIS: 11 DEGREES
VENTRICULAR RATE: 83 BPM
WBC # BLD AUTO: 9.49 THOUSAND/UL (ref 4.31–10.16)

## 2020-02-06 PROCEDURE — 85025 COMPLETE CBC W/AUTO DIFF WBC: CPT

## 2020-02-06 PROCEDURE — 85730 THROMBOPLASTIN TIME PARTIAL: CPT

## 2020-02-06 PROCEDURE — 85610 PROTHROMBIN TIME: CPT

## 2020-02-06 PROCEDURE — 87186 SC STD MICRODIL/AGAR DIL: CPT

## 2020-02-06 PROCEDURE — 87086 URINE CULTURE/COLONY COUNT: CPT

## 2020-02-06 PROCEDURE — 36415 COLL VENOUS BLD VENIPUNCTURE: CPT

## 2020-02-06 PROCEDURE — 93005 ELECTROCARDIOGRAM TRACING: CPT

## 2020-02-06 PROCEDURE — 93010 ELECTROCARDIOGRAM REPORT: CPT | Performed by: INTERNAL MEDICINE

## 2020-02-06 PROCEDURE — 80053 COMPREHEN METABOLIC PANEL: CPT

## 2020-02-06 PROCEDURE — 87077 CULTURE AEROBIC IDENTIFY: CPT

## 2020-02-09 LAB
BACTERIA UR CULT: ABNORMAL

## 2020-02-10 ENCOUNTER — TELEPHONE (OUTPATIENT)
Dept: UROLOGY | Facility: MEDICAL CENTER | Age: 85
End: 2020-02-10

## 2020-02-10 DIAGNOSIS — N30.00 ACUTE CYSTITIS WITHOUT HEMATURIA: Primary | ICD-10-CM

## 2020-02-10 RX ORDER — CIPROFLOXACIN 500 MG/1
500 TABLET, FILM COATED ORAL EVERY 12 HOURS SCHEDULED
Qty: 10 TABLET | Refills: 0 | Status: SHIPPED | OUTPATIENT
Start: 2020-02-10 | End: 2020-02-15

## 2020-02-10 NOTE — TELEPHONE ENCOUNTER
----- Message from Margarito Jauregui MD sent at 2/10/2020 12:45 PM EST -----   I had ordered cephalexin before surgery next week  However, culture shows that is not the right medicine  Do not take cephalexin    Instead, I sent Cipro to the pharmacy, start three days preop

## 2020-02-10 NOTE — TELEPHONE ENCOUNTER
Left message; Patient's U/C results show infection that can't be treated with the Keflex that he'd previously been prescribed  Patient is to discontinue this antibiotic and  Cipro from his pharmacy to start 3 days prior to surgery  He is to call back if he has any questions or concerns in regards to this message

## 2020-02-12 NOTE — PRE-PROCEDURE INSTRUCTIONS
Pre-Surgery Instructions:   Medication Instructions    albuterol (PROVENTIL HFA,VENTOLIN HFA) 90 mcg/act inhaler Instructed patient per Anesthesia Guidelines   apixaban (ELIQUIS) 5 mg Instructed patient per Anesthesia Guidelines   atenolol (TENORMIN) 25 mg tablet Instructed patient per Anesthesia Guidelines   atorvastatin (LIPITOR) 40 mg tablet Instructed patient per Anesthesia Guidelines   budesonide-formoterol (SYMBICORT) 160-4 5 mcg/act inhaler Instructed patient per Anesthesia Guidelines   diltiazem (CARDIZEM) 120 MG tablet Instructed patient per Anesthesia Guidelines   donepezil (ARICEPT) 10 mg tablet Instructed patient per Anesthesia Guidelines   furosemide (LASIX) 20 mg tablet Instructed patient per Anesthesia Guidelines   lisinopril (ZESTRIL) 40 mg tablet Instructed patient per Anesthesia Guidelines   mirtazapine (REMERON) 30 mg tablet Instructed patient per Anesthesia Guidelines   Multiple Vitamin (MULTIVITAMIN) capsule Instructed patient per Anesthesia Guidelines   potassium chloride (MICRO-K) 10 MEQ CR capsule Instructed patient per Anesthesia Guidelines   traMADol (ULTRAM) 50 mg tablet Instructed patient per Anesthesia Guidelines  Patient's wife  instructed *pt to take*diltiazem and atenolol *with a sip of water the morning of surgery and symbicort IN  Patient given/ instructed on use of chlorhexidine soap per hospital protocol    Patient instructed to stop all ASA, NSAIDS, vitamins and herbal supplements one week prior to surgery or per Dr Lomeli Sat

## 2020-02-17 ENCOUNTER — ANESTHESIA EVENT (OUTPATIENT)
Dept: PERIOP | Facility: HOSPITAL | Age: 85
End: 2020-02-17
Payer: COMMERCIAL

## 2020-02-17 PROCEDURE — NC001 PR NO CHARGE: Performed by: UROLOGY

## 2020-02-17 NOTE — H&P
HISTORY AND PHYSICAL  ? ? Patient Name: Justine Atwood  Patient MRN: [id number]  Attending Provider: Shayna Albrecht MD  Service: Urology  Chief Complaint    Urgency incontinence, with Jacobs in place    HPI   Justine Atwood is a 80 y o  male with worsening urgency incontinence, leakage around his Jacobs  Respond has responded well to Botox in the past     I plan cystoscopy, Botox injection  Potential risks and complications discussed, and informed consent was given by the patient  Medications  Meds/Allergies     No current facility-administered medications for this encounter  Cannot display prior to admission medications because the patient has not been admitted in this contact  No current facility-administered medications for this encounter       Current Outpatient Medications:     albuterol (PROVENTIL HFA,VENTOLIN HFA) 90 mcg/act inhaler, Inhale 2 puffs every 6 (six) hours as needed for wheezing, Disp: , Rfl:     apixaban (ELIQUIS) 5 mg, Take 5 mg by mouth 2 (two) times a day, Disp: , Rfl:     atenolol (TENORMIN) 25 mg tablet, Take 50 mg by mouth daily , Disp: , Rfl:     atorvastatin (LIPITOR) 40 mg tablet, Take 40 mg by mouth daily, Disp: , Rfl:     budesonide-formoterol (SYMBICORT) 160-4 5 mcg/act inhaler, 2 (two) times a day as needed , Disp: , Rfl:     diltiazem (CARDIZEM) 120 MG tablet, Take 120 mg by mouth daily, Disp: , Rfl:     donepezil (ARICEPT) 10 mg tablet, Take 10 mg by mouth daily, Disp: , Rfl:     furosemide (LASIX) 20 mg tablet, Take 20 mg by mouth daily, Disp: , Rfl:     lisinopril (ZESTRIL) 40 mg tablet, Take 40 mg by mouth daily, Disp: , Rfl:     mirtazapine (REMERON) 30 mg tablet, Take 30 mg by mouth daily at bedtime , Disp: , Rfl:     Multiple Vitamin (MULTIVITAMIN) capsule, Take 1 capsule by mouth daily, Disp: , Rfl:     potassium chloride (MICRO-K) 10 MEQ CR capsule, Take 10 mEq by mouth 2 (two) times a day, Disp: , Rfl:     traMADol (ULTRAM) 50 mg tablet, Take 50 mg by mouth 2 (two) times a day as needed for moderate pain Pt reports taking 2x/day, Disp: , Rfl:     amoxicillin (AMOXIL) 500 MG tablet, Take 500 mg by mouth daily as needed Only before dental procedures, Disp: , Rfl:     dabigatran etexilate (PRADAXA) 150 mg capsu, Take 150 mg by mouth 2 (two) times a day, Disp: , Rfl:   Review of Systems  10 point review of systems negative except as noted in HPI  Allergies  No Known Allergies  PMH  Past Medical History:   Diagnosis Date    [facility]     Atrial fibrillation [facility]     COPD [facility]chronic obstructive [facility] disease) [facility]     CPAP (continuous positive airway pressure) dependence     CVA [facility]cere[facility] vascular accident) [facility]     possible but unsure when it was, was seen on MRI    Dental bridge present     lower    Does use hearing aid     History of pneumonia as a child     History of radiation therapy     Hyperlipidemia     Hypertension     [facility]     Other neuromuscular dysfunction of bladder     Overactive bladder     PAC (premature atrial contraction)     [facility] cancer [facility]     Prostatitis     history of    RA [facility]rheumatoid arthritis) [facility]     Rheumatoid arthritis ([facility]     Shortness of breath     Sleep apnea     Urge incontinence     Urinary retention     Wears glasses     Wears partial dentures     lower metal hardware     Past surgical history  Past Surgical History:   Procedure Laterality Date    BOTOX INJECTION N/A 2/26/2019    Procedure: INJECTION BOTULINUM TOXIN (BOTOX), URETHRAL DILATION;  Surgeon: Sol Villalta MD;  [facility];  Service: Urology    FRACTURE SURGERY      left femur    JOINT REPLACEMENT      shoulder left    OTHER SURGICAL HISTORY      pt reports implant nonfunctioning electrical device left lower back around hip    [place] OPEN TREATMENT BIMALLEOLAR ANKLE FRACTURE Right 12/29/2017    Procedure: Open Reduction Internal Fixation Bimalleolar fracture right lower leg using plates and screws;  Surgeon: Tommie Diggs DPM;  [facility];  Service: Podiatry    PROSTATECTOMY      SHOULDER SURGERY       Social history  Social History     Tobacco Use    Smoking status: Former Smoker    Smokeless tobacco: Never Used    Tobacco comment: stopped 30 years ago    Substance Use Topics    Alcohol use: No    Drug use: No     ?  Physical Exam     vs  General appearance: alert and oriented, in no acute distress  Head: Normocephalic, without obvious abnormality, atraumatic  Eyes: conjunctivae/corneas clear  PERRL, EOM's intact  Fundi benign  Throat: lips, mucosa, and tongue normal; teeth and gums normal  Neck: no adenopathy, no carotid bruit, no JVD, supple, symmetrical, trachea midline and thyroid not enlarged, symmetric, no tenderness/mass/nodules  Back: symmetric, no curvature  ROM normal  No CVA tenderness    Lungs: clear to auscultation bilaterally  Chest wall: no tenderness  Heart: regular rate and rhythm, S1, S2 normal, no murmur, click, rub or gallop  Abdomen: soft, non-tender; bowel sounds normal; no masses,  no organomegaly  Extremities: extremities normal, warm and well-perfused; no cyanosis, clubbing, or edema  Serg Griffith MD

## 2020-02-18 ENCOUNTER — ANESTHESIA (OUTPATIENT)
Dept: PERIOP | Facility: HOSPITAL | Age: 85
End: 2020-02-18
Payer: COMMERCIAL

## 2020-02-18 ENCOUNTER — HOSPITAL ENCOUNTER (OUTPATIENT)
Facility: HOSPITAL | Age: 85
Setting detail: OUTPATIENT SURGERY
Discharge: HOME | End: 2020-02-18
Attending: UROLOGY | Admitting: UROLOGY
Payer: COMMERCIAL

## 2020-02-18 VITALS
RESPIRATION RATE: 18 BRPM | SYSTOLIC BLOOD PRESSURE: 156 MMHG | HEART RATE: 74 BPM | WEIGHT: 197 LBS | HEIGHT: 70 IN | DIASTOLIC BLOOD PRESSURE: 96 MMHG | BODY MASS INDEX: 28.2 KG/M2 | TEMPERATURE: 97.7 F | OXYGEN SATURATION: 96 %

## 2020-02-18 DIAGNOSIS — N32.81 OVERACTIVE BLADDER: Primary | ICD-10-CM

## 2020-02-18 PROCEDURE — 52287 CYSTOSCOPY CHEMODENERVATION: CPT | Performed by: UROLOGY

## 2020-02-18 PROCEDURE — C1758 CATHETER, URETERAL: HCPCS | Performed by: UROLOGY

## 2020-02-18 RX ORDER — CEPHALEXIN 500 MG/1
500 CAPSULE ORAL EVERY 12 HOURS SCHEDULED
Qty: 10 CAPSULE | Refills: 0 | Status: SHIPPED | OUTPATIENT
Start: 2020-02-18 | End: 2020-02-23

## 2020-02-18 RX ORDER — HYDROMORPHONE HCL/PF 1 MG/ML
0.5 SYRINGE (ML) INJECTION
Status: DISCONTINUED | OUTPATIENT
Start: 2020-02-18 | End: 2020-02-18 | Stop reason: HOSPADM

## 2020-02-18 RX ORDER — LIDOCAINE HYDROCHLORIDE 20 MG/ML
INJECTION, SOLUTION EPIDURAL; INFILTRATION; INTRACAUDAL; PERINEURAL AS NEEDED
Status: DISCONTINUED | OUTPATIENT
Start: 2020-02-18 | End: 2020-02-18 | Stop reason: SURG

## 2020-02-18 RX ORDER — PROPOFOL 10 MG/ML
INJECTION, EMULSION INTRAVENOUS CONTINUOUS PRN
Status: DISCONTINUED | OUTPATIENT
Start: 2020-02-18 | End: 2020-02-18 | Stop reason: SURG

## 2020-02-18 RX ORDER — MAGNESIUM HYDROXIDE 1200 MG/15ML
LIQUID ORAL AS NEEDED
Status: DISCONTINUED | OUTPATIENT
Start: 2020-02-18 | End: 2020-02-18 | Stop reason: HOSPADM

## 2020-02-18 RX ORDER — SODIUM CHLORIDE 9 MG/ML
125 INJECTION, SOLUTION INTRAVENOUS CONTINUOUS
Status: DISCONTINUED | OUTPATIENT
Start: 2020-02-18 | End: 2020-02-18 | Stop reason: HOSPADM

## 2020-02-18 RX ORDER — DEXAMETHASONE SODIUM PHOSPHATE 4 MG/ML
INJECTION, SOLUTION INTRA-ARTICULAR; INTRALESIONAL; INTRAMUSCULAR; INTRAVENOUS; SOFT TISSUE AS NEEDED
Status: DISCONTINUED | OUTPATIENT
Start: 2020-02-18 | End: 2020-02-18 | Stop reason: SURG

## 2020-02-18 RX ORDER — FENTANYL CITRATE/PF 50 MCG/ML
50 SYRINGE (ML) INJECTION
Status: DISCONTINUED | OUTPATIENT
Start: 2020-02-18 | End: 2020-02-18 | Stop reason: HOSPADM

## 2020-02-18 RX ORDER — CIPROFLOXACIN 500 MG/1
500 TABLET, FILM COATED ORAL EVERY 12 HOURS SCHEDULED
COMMUNITY
End: 2022-02-09 | Stop reason: ALTCHOICE

## 2020-02-18 RX ORDER — OXYCODONE HYDROCHLORIDE AND ACETAMINOPHEN 5; 325 MG/1; MG/1
1 TABLET ORAL EVERY 4 HOURS PRN
Status: DISCONTINUED | OUTPATIENT
Start: 2020-02-18 | End: 2020-02-18 | Stop reason: HOSPADM

## 2020-02-18 RX ORDER — ONDANSETRON 2 MG/ML
4 INJECTION INTRAMUSCULAR; INTRAVENOUS ONCE AS NEEDED
Status: DISCONTINUED | OUTPATIENT
Start: 2020-02-18 | End: 2020-02-18 | Stop reason: HOSPADM

## 2020-02-18 RX ORDER — FENTANYL CITRATE 50 UG/ML
INJECTION, SOLUTION INTRAMUSCULAR; INTRAVENOUS AS NEEDED
Status: DISCONTINUED | OUTPATIENT
Start: 2020-02-18 | End: 2020-02-18 | Stop reason: SURG

## 2020-02-18 RX ORDER — ONDANSETRON 2 MG/ML
INJECTION INTRAMUSCULAR; INTRAVENOUS AS NEEDED
Status: DISCONTINUED | OUTPATIENT
Start: 2020-02-18 | End: 2020-02-18 | Stop reason: SURG

## 2020-02-18 RX ORDER — MEPERIDINE HYDROCHLORIDE 50 MG/ML
12.5 INJECTION INTRAMUSCULAR; INTRAVENOUS; SUBCUTANEOUS ONCE AS NEEDED
Status: DISCONTINUED | OUTPATIENT
Start: 2020-02-18 | End: 2020-02-18 | Stop reason: HOSPADM

## 2020-02-18 RX ORDER — OXYCODONE HYDROCHLORIDE AND ACETAMINOPHEN 5; 325 MG/1; MG/1
2 TABLET ORAL EVERY 4 HOURS PRN
Status: DISCONTINUED | OUTPATIENT
Start: 2020-02-18 | End: 2020-02-18 | Stop reason: HOSPADM

## 2020-02-18 RX ORDER — PROPOFOL 10 MG/ML
INJECTION, EMULSION INTRAVENOUS AS NEEDED
Status: DISCONTINUED | OUTPATIENT
Start: 2020-02-18 | End: 2020-02-18 | Stop reason: SURG

## 2020-02-18 RX ADMIN — LIDOCAINE HYDROCHLORIDE 100 MG: 20 INJECTION, SOLUTION EPIDURAL; INFILTRATION; INTRACAUDAL; PERINEURAL at 08:40

## 2020-02-18 RX ADMIN — PROPOFOL 90 MCG/KG/MIN: 10 INJECTION, EMULSION INTRAVENOUS at 08:40

## 2020-02-18 RX ADMIN — PROPOFOL 50 MG: 10 INJECTION, EMULSION INTRAVENOUS at 08:40

## 2020-02-18 RX ADMIN — FENTANYL CITRATE 50 MCG: 50 INJECTION, SOLUTION INTRAMUSCULAR; INTRAVENOUS at 09:44

## 2020-02-18 RX ADMIN — Medication 2000 MG: at 08:29

## 2020-02-18 RX ADMIN — FENTANYL CITRATE 50 MCG: 50 INJECTION, SOLUTION INTRAMUSCULAR; INTRAVENOUS at 08:32

## 2020-02-18 RX ADMIN — DEXAMETHASONE SODIUM PHOSPHATE 4 MG: 4 INJECTION, SOLUTION INTRAMUSCULAR; INTRAVENOUS at 08:47

## 2020-02-18 RX ADMIN — ONDANSETRON 4 MG: 2 INJECTION INTRAMUSCULAR; INTRAVENOUS at 08:47

## 2020-02-18 RX ADMIN — SODIUM CHLORIDE 125 ML/HR: 0.9 INJECTION, SOLUTION INTRAVENOUS at 08:17

## 2020-02-18 RX ADMIN — FENTANYL CITRATE 50 MCG: 50 INJECTION, SOLUTION INTRAMUSCULAR; INTRAVENOUS at 08:40

## 2020-02-18 NOTE — ANESTHESIA PREPROCEDURE EVALUATION
Review of Systems/Medical History  Patient summary reviewed  Chart reviewed  No history of anesthetic complications     Cardiovascular  Hyperlipidemia, Hypertension controlled, Dysrhythmias , atrial fibrillation,    Pulmonary  Smoker ex-smoker  , COPD , Sleep apnea ,        GI/Hepatic  Negative GI/hepatic ROS          Prostatic disorder, history of prostate cancer       Endo/Other     GYN       Hematology    Coagulation disorder (LD Pradaxa 2 days ago) currently taking oral anticoagulants,    Musculoskeletal  Rheumatoid arthritis Severity: moderate, Back pain , lumbar pain,   Arthritis     Neurology    CVA ("Questionable") , no residual symptoms,   Comment: Mild dementia Psychology   Negative psychology ROS              Physical Exam    Airway    Mallampati score: II  TM Distance: >3 FB  Neck ROM: full     Dental   Comment: "Dental appliance" on bottom,     Cardiovascular  Rhythm: irregular, Rate: normal, Murmur,     Pulmonary  Pulmonary exam normal Breath sounds clear to auscultation,     Other Findings        Anesthesia Plan  ASA Score- 3     Anesthesia Type- general with ASA Monitors  Additional Monitors:   Airway Plan:         Plan Factors-Patient not instructed to abstain from smoking on day of procedure  Patient did not smoke on day of surgery  Induction- intravenous  Postoperative Plan- Plan for postoperative opioid use  Informed Consent- Anesthetic plan and risks discussed with patient

## 2020-02-18 NOTE — OP NOTE
OPERATIVE REPORT  PATIENT NAME: Gordo Chinchilla    :  1935  MRN: [id number]  Pt Location: [facility]    SURGERY DATE: 2020    Surgeon(s) and Role:     * Sd Hogue MD - Primary    Preop Diagnosis:  Overactive bladder [N32 81]    Post-Op Diagnosis Codes:     * Overactive bladder [N32 81]    Procedure(s) (LRB):  INJ BOTULINUM TOXIN (N/A)  CYSTOSCOPY (N/A)    Specimen(s):  * No specimens in log *    Estimated Blood Loss:   0 mL    Drains:  Urethral Catheter Double-lumen; Latex;Coude (Active)   Number of days: 357       Urethral Catheter Latex;Coude 18 Fr  (Active)   Site Assessment Clean;Skin intact 2020 11:15 AM   Collection Container Leg bag 2020 11:15 AM   Securement Method Securing device (Describe) 2020 11:15 AM   Output (mL) 150 mL 2020 11:15 AM   Number of days: 0       Anesthesia Type:   General/LMA    Operative Indications:  Overactive bladder [N32 81]  Leakage around catheter    Operative Findings:  Hypertonic bladder    Complications:   None    Procedure and Technique:    After the patient was placed under adequate general anesthesia, he was prepped and draped using Betadine solution in lithotomy position  The 25 Arabic scope was passed without difficulty in the urethra which had no strictures  there was mild bladder neck elevation from a mildly enlarged prostate  The bladder had sediment in inflammation from the prior catheter, diverticula throughout, but no lesion tumor stones     The bladder was flushed out to remove any residual contaminated urine  The Botox was then injected  100 units Botox had been in mixed in 30 mL sterile saline by pharmacy  I injected 1 mL in 30 different locations on the posterior wall  This was in a grid 6 x 5  Minimal bleeding was encountered  Care was taken to not inject around the trigone or the orifice sees    After thorough injection of Botox, scope was removed, Jacobs catheter inserted, patient taken to recovery in good condition         I was present for the entire procedure    Patient Disposition:  PACU     SIGNATURE: Margarito Jauregui MD  DATE: February 18, 2020  TIME: 12:48 PM

## 2020-02-19 ENCOUNTER — TELEPHONE (OUTPATIENT)
Dept: UROLOGY | Facility: MEDICAL CENTER | Age: 85
End: 2020-02-19

## 2020-02-19 DIAGNOSIS — N32.81 OVERACTIVE BLADDER: Primary | ICD-10-CM

## 2020-02-19 RX ORDER — HYDROCODONE BITARTRATE AND ACETAMINOPHEN 5; 325 MG/1; MG/1
TABLET ORAL
Qty: 8 TABLET | Refills: 0 | Status: SHIPPED | OUTPATIENT
Start: 2020-02-19 | End: 2021-06-09

## 2020-02-19 NOTE — PROGRESS NOTES
I sent Norco 8 tablets to pharmacy for postop discomfort after Botox installation yesterday  He can resume Eliquis now

## 2020-02-19 NOTE — TELEPHONE ENCOUNTER
----- Message from Johann Tolentino MD sent at 2/19/2020 11:21 AM EST -----   I sent Norco   Resume Eliquis now

## 2020-02-19 NOTE — TELEPHONE ENCOUNTER
Patient managed by Dr Momo Kilpatrick seen at [facility]  Patients wife called in stating patient needs pain medication from Botox procedure  Also, wife asked if patient can start taking Eliquis again starting this evening  Patient can be reached at [phone]

## 2020-04-03 ENCOUNTER — TELEMEDICINE (OUTPATIENT)
Dept: UROLOGY | Facility: MEDICAL CENTER | Age: 85
End: 2020-04-03
Payer: COMMERCIAL

## 2020-04-03 DIAGNOSIS — N32.81 OVERACTIVE BLADDER: Primary | ICD-10-CM

## 2020-04-03 PROCEDURE — 99213 OFFICE O/P EST LOW 20 MIN: CPT | Performed by: UROLOGY

## 2020-04-16 ENCOUNTER — TELEPHONE (OUTPATIENT)
Dept: UROLOGY | Facility: MEDICAL CENTER | Age: 85
End: 2020-04-16

## 2020-04-20 ENCOUNTER — PROCEDURE VISIT (OUTPATIENT)
Dept: UROLOGY | Facility: MEDICAL CENTER | Age: 85
End: 2020-04-20
Payer: COMMERCIAL

## 2020-04-20 VITALS
BODY MASS INDEX: 27.92 KG/M2 | WEIGHT: 195 LBS | HEIGHT: 70 IN | DIASTOLIC BLOOD PRESSURE: 70 MMHG | SYSTOLIC BLOOD PRESSURE: 120 MMHG | HEART RATE: 40 BPM

## 2020-04-20 DIAGNOSIS — N32.81 OVERACTIVE BLADDER: ICD-10-CM

## 2020-04-20 DIAGNOSIS — R31.0 GROSS HEMATURIA: Primary | ICD-10-CM

## 2020-04-20 PROCEDURE — 99213 OFFICE O/P EST LOW 20 MIN: CPT | Performed by: UROLOGY

## 2020-04-20 PROCEDURE — 52000 CYSTOURETHROSCOPY: CPT | Performed by: UROLOGY

## 2020-08-11 ENCOUNTER — TRANSCRIBE ORDERS (OUTPATIENT)
Dept: ADMINISTRATIVE | Facility: HOSPITAL | Age: 85
End: 2020-08-11

## 2020-08-11 DIAGNOSIS — R41.3 MEMORY LOSS: ICD-10-CM

## 2020-08-11 DIAGNOSIS — E21.3 HYPERPARATHYROIDISM (HCC): Primary | ICD-10-CM

## 2020-08-14 ENCOUNTER — APPOINTMENT (OUTPATIENT)
Dept: LAB | Facility: HOSPITAL | Age: 85
End: 2020-08-14
Payer: COMMERCIAL

## 2020-08-14 ENCOUNTER — TRANSCRIBE ORDERS (OUTPATIENT)
Dept: ADMINISTRATIVE | Facility: HOSPITAL | Age: 85
End: 2020-08-14

## 2020-08-14 DIAGNOSIS — R41.3 MEMORY LOSS: ICD-10-CM

## 2020-08-14 DIAGNOSIS — R41.3 MEMORY LOSS: Primary | ICD-10-CM

## 2020-08-14 DIAGNOSIS — R53.83 FATIGUE, UNSPECIFIED TYPE: ICD-10-CM

## 2020-08-14 DIAGNOSIS — R53.83 FATIGUE, UNSPECIFIED TYPE: Primary | ICD-10-CM

## 2020-08-14 LAB
ALBUMIN SERPL BCP-MCNC: 3.7 G/DL (ref 3.5–5)
ALP SERPL-CCNC: 74 U/L (ref 46–116)
ALT SERPL W P-5'-P-CCNC: 18 U/L (ref 12–78)
ANION GAP SERPL CALCULATED.3IONS-SCNC: 7 MMOL/L (ref 4–13)
AST SERPL W P-5'-P-CCNC: 20 U/L (ref 5–45)
BACTERIA UR QL AUTO: ABNORMAL /HPF
BASOPHILS # BLD AUTO: 0.06 THOUSANDS/ΜL (ref 0–0.1)
BASOPHILS NFR BLD AUTO: 1 % (ref 0–1)
BILIRUB SERPL-MCNC: 0.59 MG/DL (ref 0.2–1)
BILIRUB UR QL STRIP: NEGATIVE
BUN SERPL-MCNC: 21 MG/DL (ref 5–25)
CALCIUM ALBUM COR SERPL-MCNC: 10.5 MG/DL (ref 8.3–10.1)
CALCIUM SERPL-MCNC: 10.3 MG/DL (ref 8.3–10.1)
CHLORIDE SERPL-SCNC: 107 MMOL/L (ref 100–108)
CLARITY UR: ABNORMAL
CO2 SERPL-SCNC: 31 MMOL/L (ref 21–32)
COLOR UR: YELLOW
CREAT SERPL-MCNC: 1.26 MG/DL (ref 0.6–1.3)
EOSINOPHIL # BLD AUTO: 0.17 THOUSAND/ΜL (ref 0–0.61)
EOSINOPHIL NFR BLD AUTO: 2 % (ref 0–6)
ERYTHROCYTE [DISTWIDTH] IN BLOOD BY AUTOMATED COUNT: 14.4 % (ref 11.6–15.1)
GFR SERPL CREATININE-BSD FRML MDRD: 52 ML/MIN/1.73SQ M
GLUCOSE P FAST SERPL-MCNC: 96 MG/DL (ref 65–99)
GLUCOSE UR STRIP-MCNC: NEGATIVE MG/DL
HCT VFR BLD AUTO: 47.6 % (ref 36.5–49.3)
HGB BLD-MCNC: 14.6 G/DL (ref 12–17)
HGB UR QL STRIP.AUTO: NEGATIVE
IMM GRANULOCYTES # BLD AUTO: 0.02 THOUSAND/UL (ref 0–0.2)
IMM GRANULOCYTES NFR BLD AUTO: 0 % (ref 0–2)
KETONES UR STRIP-MCNC: NEGATIVE MG/DL
LEUKOCYTE ESTERASE UR QL STRIP: NEGATIVE
LYMPHOCYTES # BLD AUTO: 1.44 THOUSANDS/ΜL (ref 0.6–4.47)
LYMPHOCYTES NFR BLD AUTO: 16 % (ref 14–44)
MCH RBC QN AUTO: 29.7 PG (ref 26.8–34.3)
MCHC RBC AUTO-ENTMCNC: 30.7 G/DL (ref 31.4–37.4)
MCV RBC AUTO: 97 FL (ref 82–98)
MONOCYTES # BLD AUTO: 0.63 THOUSAND/ΜL (ref 0.17–1.22)
MONOCYTES NFR BLD AUTO: 7 % (ref 4–12)
NEUTROPHILS # BLD AUTO: 6.46 THOUSANDS/ΜL (ref 1.85–7.62)
NEUTS SEG NFR BLD AUTO: 74 % (ref 43–75)
NITRITE UR QL STRIP: POSITIVE
NON-SQ EPI CELLS URNS QL MICRO: ABNORMAL /HPF
NRBC BLD AUTO-RTO: 0 /100 WBCS
PH UR STRIP.AUTO: 6.5 [PH]
PLATELET # BLD AUTO: 200 THOUSANDS/UL (ref 149–390)
PMV BLD AUTO: 8.7 FL (ref 8.9–12.7)
POTASSIUM SERPL-SCNC: 4.5 MMOL/L (ref 3.5–5.3)
PROT SERPL-MCNC: 7.6 G/DL (ref 6.4–8.2)
PROT UR STRIP-MCNC: ABNORMAL MG/DL
RBC # BLD AUTO: 4.91 MILLION/UL (ref 3.88–5.62)
RBC #/AREA URNS AUTO: ABNORMAL /HPF
SODIUM SERPL-SCNC: 145 MMOL/L (ref 136–145)
SP GR UR STRIP.AUTO: 1.02 (ref 1–1.03)
TSH SERPL DL<=0.05 MIU/L-ACNC: 0.64 UIU/ML (ref 0.36–3.74)
UROBILINOGEN UR QL STRIP.AUTO: 0.2 E.U./DL
VIT B12 SERPL-MCNC: 551 PG/ML (ref 100–900)
WBC # BLD AUTO: 8.78 THOUSAND/UL (ref 4.31–10.16)
WBC #/AREA URNS AUTO: ABNORMAL /HPF

## 2020-08-14 PROCEDURE — 82607 VITAMIN B-12: CPT

## 2020-08-14 PROCEDURE — 80053 COMPREHEN METABOLIC PANEL: CPT

## 2020-08-14 PROCEDURE — 81001 URINALYSIS AUTO W/SCOPE: CPT | Performed by: FAMILY MEDICINE

## 2020-08-14 PROCEDURE — 85025 COMPLETE CBC W/AUTO DIFF WBC: CPT

## 2020-08-14 PROCEDURE — 84443 ASSAY THYROID STIM HORMONE: CPT

## 2020-08-14 PROCEDURE — 87077 CULTURE AEROBIC IDENTIFY: CPT

## 2020-08-14 PROCEDURE — 87186 SC STD MICRODIL/AGAR DIL: CPT

## 2020-08-14 PROCEDURE — 87086 URINE CULTURE/COLONY COUNT: CPT

## 2020-08-14 PROCEDURE — 36415 COLL VENOUS BLD VENIPUNCTURE: CPT

## 2020-08-16 LAB — BACTERIA UR CULT: ABNORMAL

## 2020-08-26 ENCOUNTER — TELEPHONE (OUTPATIENT)
Dept: UROLOGY | Facility: MEDICAL CENTER | Age: 85
End: 2020-08-26

## 2020-08-26 DIAGNOSIS — R31.0 GROSS HEMATURIA: Primary | ICD-10-CM

## 2020-08-26 NOTE — TELEPHONE ENCOUNTER
Call placed to Pt's wife and express that she is a retired nurse and is the person who change Pt's Catheter  Pt's wife states she will be going with Pt to do Urine specimen for aiding to insure that it will be a clean catch  Order was placed in [facility] for Urinalysis after completion of Antibiotics

## 2020-08-26 NOTE — TELEPHONE ENCOUNTER
Pt managed by Dr Garcia,pt's wife called to inform office pt had seen pcp 08/10/20 at that time he had urine testing which culture came back positive Pseudomonas and was treated with 10 day course Levaquin which he completed 08/25/20, she feels the pt needs to be taught how to do urine catch from catheter

## 2020-08-31 ENCOUNTER — APPOINTMENT (OUTPATIENT)
Dept: LAB | Facility: HOSPITAL | Age: 85
End: 2020-08-31
Payer: COMMERCIAL

## 2020-08-31 LAB
BACTERIA UR QL AUTO: ABNORMAL /HPF
BILIRUB UR QL STRIP: NEGATIVE
CLARITY UR: CLEAR
COLOR UR: YELLOW
GLUCOSE UR STRIP-MCNC: NEGATIVE MG/DL
HGB UR QL STRIP.AUTO: NEGATIVE
KETONES UR STRIP-MCNC: NEGATIVE MG/DL
LEUKOCYTE ESTERASE UR QL STRIP: NEGATIVE
NITRITE UR QL STRIP: NEGATIVE
NON-SQ EPI CELLS URNS QL MICRO: ABNORMAL /HPF
PH UR STRIP.AUTO: 6 [PH]
PROT UR STRIP-MCNC: ABNORMAL MG/DL
RBC #/AREA URNS AUTO: ABNORMAL /HPF
SP GR UR STRIP.AUTO: 1.02 (ref 1–1.03)
UROBILINOGEN UR QL STRIP.AUTO: 0.2 E.U./DL
WBC #/AREA URNS AUTO: ABNORMAL /HPF

## 2020-08-31 PROCEDURE — 81001 URINALYSIS AUTO W/SCOPE: CPT

## 2020-10-23 ENCOUNTER — HOSPITAL ENCOUNTER (OUTPATIENT)
Dept: NUCLEAR MEDICINE | Facility: HOSPITAL | Age: 85
Discharge: HOME | End: 2020-10-23

## 2020-10-23 DIAGNOSIS — E21.3 HYPERPARATHYROIDISM (HCC): ICD-10-CM

## 2020-12-28 ENCOUNTER — OFFICE VISIT (OUTPATIENT)
Dept: UROLOGY | Facility: MEDICAL CENTER | Age: 85
End: 2020-12-28
Payer: COMMERCIAL

## 2020-12-28 VITALS
HEIGHT: 70 IN | WEIGHT: 197 LBS | HEART RATE: 58 BPM | BODY MASS INDEX: 28.2 KG/M2 | SYSTOLIC BLOOD PRESSURE: 118 MMHG | DIASTOLIC BLOOD PRESSURE: 78 MMHG

## 2020-12-28 DIAGNOSIS — R33.9 URINARY RETENTION WITH INCOMPLETE BLADDER EMPTYING: Primary | ICD-10-CM

## 2020-12-28 PROCEDURE — 99214 OFFICE O/P EST MOD 30 MIN: CPT | Performed by: UROLOGY

## 2021-01-20 DIAGNOSIS — Z23 ENCOUNTER FOR IMMUNIZATION: ICD-10-CM

## 2021-01-25 ENCOUNTER — IMMUNIZATIONS (OUTPATIENT)
Dept: FAMILY MEDICINE CLINIC | Facility: HOSPITAL | Age: 86
End: 2021-01-25

## 2021-01-25 DIAGNOSIS — Z23 ENCOUNTER FOR IMMUNIZATION: Primary | ICD-10-CM

## 2021-01-25 PROCEDURE — 91301 SARS-COV-2 / COVID-19 MRNA VACCINE (MODERNA) 100 MCG: CPT

## 2021-01-25 PROCEDURE — 0011A SARS-COV-2 / COVID-19 MRNA VACCINE (MODERNA) 100 MCG: CPT

## 2021-02-22 ENCOUNTER — IMMUNIZATIONS (OUTPATIENT)
Dept: FAMILY MEDICINE CLINIC | Facility: HOSPITAL | Age: 86
End: 2021-02-22

## 2021-02-22 DIAGNOSIS — Z23 ENCOUNTER FOR IMMUNIZATION: Primary | ICD-10-CM

## 2021-02-22 PROCEDURE — 0012A SARS-COV-2 / COVID-19 MRNA VACCINE (MODERNA) 100 MCG: CPT

## 2021-02-22 PROCEDURE — 91301 SARS-COV-2 / COVID-19 MRNA VACCINE (MODERNA) 100 MCG: CPT

## 2021-03-10 ENCOUNTER — APPOINTMENT (OUTPATIENT)
Dept: LAB | Facility: HOSPITAL | Age: 86
End: 2021-03-10
Payer: COMMERCIAL

## 2021-03-10 ENCOUNTER — TRANSCRIBE ORDERS (OUTPATIENT)
Dept: ADMINISTRATIVE | Facility: HOSPITAL | Age: 86
End: 2021-03-10

## 2021-03-10 DIAGNOSIS — I35.1 AORTIC VALVE INSUFFICIENCY, ETIOLOGY OF CARDIAC VALVE DISEASE UNSPECIFIED: ICD-10-CM

## 2021-03-10 DIAGNOSIS — E78.2 MIXED HYPERLIPIDEMIA: ICD-10-CM

## 2021-03-10 DIAGNOSIS — E21.3 HYPERPARATHYROIDISM (HCC): ICD-10-CM

## 2021-03-10 DIAGNOSIS — R73.01 IMPAIRED FASTING GLUCOSE: ICD-10-CM

## 2021-03-10 DIAGNOSIS — I35.1 AORTIC VALVE INSUFFICIENCY, ETIOLOGY OF CARDIAC VALVE DISEASE UNSPECIFIED: Primary | ICD-10-CM

## 2021-03-10 DIAGNOSIS — G47.00 INSOMNIA, UNSPECIFIED TYPE: Primary | ICD-10-CM

## 2021-03-10 DIAGNOSIS — G47.00 INSOMNIA, UNSPECIFIED TYPE: ICD-10-CM

## 2021-03-10 LAB
ALBUMIN SERPL BCP-MCNC: 3.7 G/DL (ref 3.5–5)
ALP SERPL-CCNC: 75 U/L (ref 46–116)
ALT SERPL W P-5'-P-CCNC: 18 U/L (ref 12–78)
ANION GAP SERPL CALCULATED.3IONS-SCNC: 6 MMOL/L (ref 4–13)
AST SERPL W P-5'-P-CCNC: 21 U/L (ref 5–45)
BASOPHILS # BLD AUTO: 0.06 THOUSANDS/ΜL (ref 0–0.1)
BASOPHILS NFR BLD AUTO: 1 % (ref 0–1)
BILIRUB SERPL-MCNC: 0.65 MG/DL (ref 0.2–1)
BUN SERPL-MCNC: 24 MG/DL (ref 5–25)
CALCIUM SERPL-MCNC: 10.7 MG/DL (ref 8.3–10.1)
CHLORIDE SERPL-SCNC: 106 MMOL/L (ref 100–108)
CHOLEST SERPL-MCNC: 133 MG/DL (ref 50–200)
CO2 SERPL-SCNC: 32 MMOL/L (ref 21–32)
CREAT SERPL-MCNC: 1.29 MG/DL (ref 0.6–1.3)
EOSINOPHIL # BLD AUTO: 0.15 THOUSAND/ΜL (ref 0–0.61)
EOSINOPHIL NFR BLD AUTO: 2 % (ref 0–6)
ERYTHROCYTE [DISTWIDTH] IN BLOOD BY AUTOMATED COUNT: 13.9 % (ref 11.6–15.1)
EST. AVERAGE GLUCOSE BLD GHB EST-MCNC: 111 MG/DL
GFR SERPL CREATININE-BSD FRML MDRD: 50 ML/MIN/1.73SQ M
GLUCOSE P FAST SERPL-MCNC: 102 MG/DL (ref 65–99)
HBA1C MFR BLD: 5.5 %
HCT VFR BLD AUTO: 48.1 % (ref 36.5–49.3)
HDLC SERPL-MCNC: 47 MG/DL
HGB BLD-MCNC: 14.6 G/DL (ref 12–17)
IMM GRANULOCYTES # BLD AUTO: 0.02 THOUSAND/UL (ref 0–0.2)
IMM GRANULOCYTES NFR BLD AUTO: 0 % (ref 0–2)
LDLC SERPL CALC-MCNC: 70 MG/DL (ref 0–100)
LYMPHOCYTES # BLD AUTO: 1.33 THOUSANDS/ΜL (ref 0.6–4.47)
LYMPHOCYTES NFR BLD AUTO: 17 % (ref 14–44)
MCH RBC QN AUTO: 30 PG (ref 26.8–34.3)
MCHC RBC AUTO-ENTMCNC: 30.4 G/DL (ref 31.4–37.4)
MCV RBC AUTO: 99 FL (ref 82–98)
MONOCYTES # BLD AUTO: 0.65 THOUSAND/ΜL (ref 0.17–1.22)
MONOCYTES NFR BLD AUTO: 8 % (ref 4–12)
NEUTROPHILS # BLD AUTO: 5.59 THOUSANDS/ΜL (ref 1.85–7.62)
NEUTS SEG NFR BLD AUTO: 72 % (ref 43–75)
NONHDLC SERPL-MCNC: 86 MG/DL
NRBC BLD AUTO-RTO: 0 /100 WBCS
PLATELET # BLD AUTO: 229 THOUSANDS/UL (ref 149–390)
PMV BLD AUTO: 8.8 FL (ref 8.9–12.7)
POTASSIUM SERPL-SCNC: 4.5 MMOL/L (ref 3.5–5.3)
PROT SERPL-MCNC: 7.1 G/DL (ref 6.4–8.2)
PTH-INTACT SERPL-MCNC: 113.2 PG/ML (ref 18.4–80.1)
RBC # BLD AUTO: 4.87 MILLION/UL (ref 3.88–5.62)
SODIUM SERPL-SCNC: 144 MMOL/L (ref 136–145)
TRIGL SERPL-MCNC: 78 MG/DL
TSH SERPL DL<=0.05 MIU/L-ACNC: 0.53 UIU/ML (ref 0.36–3.74)
WBC # BLD AUTO: 7.8 THOUSAND/UL (ref 4.31–10.16)

## 2021-03-10 PROCEDURE — 85025 COMPLETE CBC W/AUTO DIFF WBC: CPT

## 2021-03-10 PROCEDURE — 83970 ASSAY OF PARATHORMONE: CPT

## 2021-03-10 PROCEDURE — 80053 COMPREHEN METABOLIC PANEL: CPT

## 2021-03-10 PROCEDURE — 80061 LIPID PANEL: CPT

## 2021-03-10 PROCEDURE — 84443 ASSAY THYROID STIM HORMONE: CPT

## 2021-03-10 PROCEDURE — 83036 HEMOGLOBIN GLYCOSYLATED A1C: CPT

## 2021-03-10 PROCEDURE — 36415 COLL VENOUS BLD VENIPUNCTURE: CPT

## 2021-04-14 ENCOUNTER — TELEPHONE (OUTPATIENT)
Dept: ENDOCRINOLOGY | Facility: CLINIC | Age: 86
End: 2021-04-14

## 2021-06-09 ENCOUNTER — CONSULT (OUTPATIENT)
Dept: ENDOCRINOLOGY | Facility: CLINIC | Age: 86
End: 2021-06-09
Payer: COMMERCIAL

## 2021-06-09 VITALS
HEIGHT: 70 IN | SYSTOLIC BLOOD PRESSURE: 150 MMHG | DIASTOLIC BLOOD PRESSURE: 90 MMHG | WEIGHT: 195.4 LBS | BODY MASS INDEX: 27.97 KG/M2 | HEART RATE: 77 BPM

## 2021-06-09 DIAGNOSIS — E21.0 PRIMARY HYPERPARATHYROIDISM (HCC): Primary | ICD-10-CM

## 2021-06-09 PROCEDURE — 1036F TOBACCO NON-USER: CPT | Performed by: INTERNAL MEDICINE

## 2021-06-09 PROCEDURE — 99204 OFFICE O/P NEW MOD 45 MIN: CPT | Performed by: INTERNAL MEDICINE

## 2021-06-09 NOTE — PROGRESS NOTES
Leila Shows 80 y o  male MRN: [id number]    Encounter: [id number]      Assessment/Plan     Assessment: This is a 80 y o  male with primary hyperparathyroidism  Plan:    Diagnoses and all orders for this visit:    Primary hyperparathyroidism ([address] ) - repeat lab work, DXA scan ordered, pt has osteoporosis, will discuss therapy options that will also help with hypercalcemia after DXA scan  - Consider starting sensipar if Ca > 11  - 24 hour urine collection not possible if pt has awan catheter  -     Vitamin D 25 hydroxy Lab Collect; Future  -     PTH, intact Lab Collect Lab Collect; Future  -     Comprehensive metabolic panel Lab Collect; Future  -     Magnesium Lab Collect; Future  -     Phosphorus Lab Collect; Future  -     DXA bone density spine hip and pelvis; Future    CC: primary hyperparathyroidism    Follow up in 2 months  History of Present Illness     HPI:  This is a 80 y o  male with primary hyperparathyroidism diagnosed few months ago  He stable high calcium <11 in 2020, before few episodes of elevated calcium since 2014  He had sestamibi scan at [facility] with finding of possible small parathyroid adenoma  Surgical oncology at [facility] did not recommend surgery given patients other co-morbidities especially COPD  Patient was referred to endocrinology for further evaluation  Pt is aysmtomatic of polyuria, polydipsia, declining higher mental functions memory cognition, malgias, arthralgias, ureteric colics  No history of nephrolithiasis or abnormal bone density  Has hx of ankle fracture in 2017  No family history of elevated calcium or elevated PTH  He is not taking any calcium supplements or vitamin D  He only takes 1 multivitamin daily  Review of Systems   Constitutional: Negative for fatigue and unexpected weight change  HENT: Negative for congestion, postnasal drip and sore throat  Eyes: Negative for pain and redness     Respiratory: Negative for cough, chest tightness, shortness of breath and wheezing  Cardiovascular: Negative for chest pain, palpitations and leg swelling  Gastrointestinal: Negative for abdominal pain, constipation, diarrhea, nausea and vomiting  Endocrine: Negative for polydipsia and polyuria  Genitourinary: Negative for dysuria  Musculoskeletal: Negative for arthralgias and back pain  Neurological: Negative for dizziness, light-headedness and headaches  Psychiatric/Behavioral: Negative for agitation  The patient is not nervous/anxious  All other systems reviewed and are negative        Historical Information   Past Medical History:   Diagnosis Date    Atrial fibrillation ([facility] )     [facility]     CPAP (continuous positive airway pressure) dependence     [facility]     possible but unsure when it was, was seen on MRI    Dental bridge present     lower    Does use hearing aid     History of pneumonia as a child     History of radiation therapy     Hyperlipidemia     Hypertension     [facility]     Osteoarthritis     Other neuromuscular dysfunction of bladder     Overactive bladder     PAC (premature atrial contraction)     Prostatitis     history of    Shortness of breath     Sleep apnea     Urge incontinence     Urinary retention     Wears glasses     Wears partial dentures     lower metal hardware     Past Surgical History:   Procedure Laterality Date    BOTOX INJECTION N/A 2/26/2019    Procedure: INJECTION BOTULINUM TOXIN (BOTOX), URETHRAL DILATION;  Surgeon: Meri Platt MD;  [facility];  Service: Urology    BOTOX INJECTION N/A 2/18/2020    Procedure: INJ BOTULINUM TOXIN;  Surgeon: Meri Platt MD;  [facility];  Service: Urology    CYSTOSCOPY N/A 2/18/2020    Procedure: Sharda Gordon;  Surgeon: Meri Platt MD;  [facility];  Service: Urology    FRACTURE SURGERY      left femur    JOINT REPLACEMENT shoulder left    OTHER SURGICAL HISTORY      pt reports implant nonfunctioning electrical device left lower back around hip    ME OPEN TREATMENT BIMALLEOLAR ANKLE FRACTURE Right 2017    Procedure: Open Reduction Internal Fixation Bimalleolar fracture right lower leg using plates and screws;  Surgeon: Preethi Cortez DPM;  [facility];  Service: Podiatry    PROSTATECTOMY      SHOULDER SURGERY       Social History   Social History     Substance and Sexual Activity   Alcohol Use No     Social History     Substance and Sexual Activity   Drug Use No     Social History     Tobacco Use   Smoking Status Former Smoker    Quit date:     Years since quittin 4   Smokeless Tobacco Never Used   Tobacco Comment         Family History:   Family History   Problem Relation Age of Onset    Prostate cancer Father        Meds/Allergies   Current Outpatient Medications   Medication Sig Dispense Refill    albuterol (PROVENTIL HFA,VENTOLIN HFA) 90 mcg/act inhaler Inhale 2 puffs every 6 (six) hours as needed for wheezing      amoxicillin (AMOXIL) 500 MG tablet Take 500 mg by mouth daily as needed Only before dental procedures      apixaban (ELIQUIS) 5 mg Take 5 mg by mouth 2 (two) times a day      atenolol (TENORMIN) 25 mg tablet Take 50 mg by mouth daily       atorvastatin (LIPITOR) 40 mg tablet Take 40 mg by mouth daily      budesonide-formoterol (SYMBICORT) 160-4 5 mcg/act inhaler 2 (two) times a day as needed       diltiazem (CARDIZEM) 120 MG tablet Take 120 mg by mouth daily      donepezil (ARICEPT) 10 mg tablet Take 10 mg by mouth daily      furosemide (LASIX) 20 mg tablet Take 20 mg by mouth daily      ipratropium (ATROVENT) 0 06 % nasal spray 2 sprays into each nostril 4 (four) times a day 15 mL 6    lisinopril (ZESTRIL) 40 mg tablet Take 40 mg by mouth daily      mirtazapine (REMERON) 30 mg tablet Take 30 mg by mouth daily at bedtime       Multiple Vitamin (MULTIVITAMIN) capsule Take 1 capsule by mouth daily      potassium chloride (MICRO-K) 10 MEQ CR capsule Take 10 mEq by mouth 2 (two) times a day      traMADol (ULTRAM) 50 mg tablet Take 50 mg by mouth 2 (two) times a day as needed for moderate pain Pt reports taking 2x/day      ciprofloxacin (CIPRO) 500 mg tablet Take 500 mg by mouth every 12 (twelve) hours       Current Facility-Administered Medications   Medication Dose Route Frequency Provider Last Rate Last Admin    onabotulinumtoxin A (BOTOX) 200 Units in sodium chloride (PF) 0 9 % 30 mL  200 Units Injection Once Desmond Machuca MD         No Known Allergies    Objective   Vitals: Blood pressure 150/90, pulse 77, height 5' 10" (1 778 m), weight 88 6 kg (195 lb 6 4 oz)  Physical Exam  Constitutional:       Appearance: He is well-developed  HENT:      Head: Normocephalic and atraumatic  Mouth/Throat:      Pharynx: No oropharyngeal exudate  Eyes:      Conjunctiva/sclera: Conjunctivae normal       Pupils: Pupils are equal, round, and reactive to light  Neck:      Musculoskeletal: Neck supple  Cardiovascular:      Rate and Rhythm: Normal rate and regular rhythm  Heart sounds: No murmur  Pulmonary:      Effort: Pulmonary effort is normal  No respiratory distress  Breath sounds: Normal breath sounds  Abdominal:      General: There is no distension  Palpations: Abdomen is soft  Tenderness: There is no abdominal tenderness  Skin:     General: Skin is warm and dry  Findings: No erythema  Neurological:      Mental Status: He is alert  Psychiatric:         Mood and Affect: Mood normal          The history was obtained from the review of the chart, patient  Lab Results:   Lab Results   Component Value Date/Time    TSH 3RD GENERATON 0 530 03/10/2021 11:14 AM    [facility] 08/14/2020 11:09 AM       Imaging Studies:        I have personally reviewed pertinent reports        Portions of the record may have been created with voice recognition software  Occasional wrong word or "sound a like" substitutions may have occurred due to the inherent limitations of voice recognition software  Read the chart carefully and recognize, using context, where substitutions have occurred

## 2021-06-22 ENCOUNTER — HOSPITAL ENCOUNTER (OUTPATIENT)
Dept: BONE DENSITY | Facility: MEDICAL CENTER | Age: 86
Discharge: HOME | End: 2021-06-22
Payer: COMMERCIAL

## 2021-06-22 DIAGNOSIS — E21.0 PRIMARY HYPERPARATHYROIDISM (HCC): ICD-10-CM

## 2021-06-22 PROCEDURE — 77080 DXA BONE DENSITY AXIAL: CPT

## 2021-06-24 ENCOUNTER — TELEPHONE (OUTPATIENT)
Dept: ENDOCRINOLOGY | Facility: CLINIC | Age: 86
End: 2021-06-24

## 2021-06-24 NOTE — TELEPHONE ENCOUNTER
----- Message from Alejandro Sandoval MD sent at 6/23/2021  4:33 PM EDT -----  Pls call patient regarding results: Bone density test showed osteopenia, will monitor calcium levels, at this time therapy not indicated

## 2021-07-05 ENCOUNTER — APPOINTMENT (OUTPATIENT)
Dept: LAB | Facility: HOSPITAL | Age: 86
End: 2021-07-05
Payer: COMMERCIAL

## 2021-07-05 DIAGNOSIS — E21.0 PRIMARY HYPERPARATHYROIDISM (HCC): ICD-10-CM

## 2021-07-05 LAB
25(OH)D3 SERPL-MCNC: 40.7 NG/ML (ref 30–100)
ALBUMIN SERPL BCP-MCNC: 4.2 G/DL (ref 3.5–5)
ALP SERPL-CCNC: 79 U/L (ref 46–116)
ALT SERPL W P-5'-P-CCNC: 18 U/L (ref 12–78)
ANION GAP SERPL CALCULATED.3IONS-SCNC: 7 MMOL/L (ref 4–13)
AST SERPL W P-5'-P-CCNC: 18 U/L (ref 5–45)
BILIRUB SERPL-MCNC: 0.72 MG/DL (ref 0.2–1)
BUN SERPL-MCNC: 20 MG/DL (ref 5–25)
CALCIUM SERPL-MCNC: 10.9 MG/DL (ref 8.3–10.1)
CHLORIDE SERPL-SCNC: 106 MMOL/L (ref 100–108)
CO2 SERPL-SCNC: 29 MMOL/L (ref 21–32)
CREAT SERPL-MCNC: 1.38 MG/DL (ref 0.6–1.3)
GFR SERPL CREATININE-BSD FRML MDRD: 46 ML/MIN/1.73SQ M
GLUCOSE P FAST SERPL-MCNC: 107 MG/DL (ref 65–99)
MAGNESIUM SERPL-MCNC: 2.5 MG/DL (ref 1.6–2.6)
PHOSPHATE SERPL-MCNC: 3 MG/DL (ref 2.3–4.1)
POTASSIUM SERPL-SCNC: 4.9 MMOL/L (ref 3.5–5.3)
PROT SERPL-MCNC: 7.7 G/DL (ref 6.4–8.2)
PTH-INTACT SERPL-MCNC: 91.4 PG/ML (ref 18.4–80.1)
SODIUM SERPL-SCNC: 142 MMOL/L (ref 136–145)

## 2021-07-05 PROCEDURE — 36415 COLL VENOUS BLD VENIPUNCTURE: CPT

## 2021-07-05 PROCEDURE — 84100 ASSAY OF PHOSPHORUS: CPT

## 2021-07-05 PROCEDURE — 80053 COMPREHEN METABOLIC PANEL: CPT

## 2021-07-05 PROCEDURE — 83970 ASSAY OF PARATHORMONE: CPT

## 2021-07-05 PROCEDURE — 82306 VITAMIN D 25 HYDROXY: CPT

## 2021-07-05 PROCEDURE — 83735 ASSAY OF MAGNESIUM: CPT

## 2021-07-06 ENCOUNTER — TELEPHONE (OUTPATIENT)
Dept: ENDOCRINOLOGY | Facility: CLINIC | Age: 86
End: 2021-07-06

## 2021-07-06 NOTE — TELEPHONE ENCOUNTER
----- Message from Garland Andrews MD sent at 7/5/2021  5:28 PM EDT -----  Pls call patient regarding results: normal vitamin D, electrolytes, pls do not use any calcium and vitamin D supplements, Calcium level 10 7 - stable, will monitor for now,  - we will not start sensipar at this time  - follow up with scheduled appt

## 2021-08-17 NOTE — PATIENT INSTRUCTIONS
Pls call pt, no message  Mammo and u/s are both ok  I suspected her pain is coming from her chest wall, not her breast  This study supports that suspicion  Will recheck in December.  Start Cipro four days before the procedure, twice per day

## 2021-09-23 ENCOUNTER — HOSPITAL ENCOUNTER (OUTPATIENT)
Dept: RADIOLOGY | Facility: HOSPITAL | Age: 86
Discharge: HOME | End: 2021-09-23
Payer: COMMERCIAL

## 2021-09-23 ENCOUNTER — APPOINTMENT (OUTPATIENT)
Dept: LAB | Facility: HOSPITAL | Age: 86
End: 2021-09-23
Payer: COMMERCIAL

## 2021-09-23 DIAGNOSIS — Z12.5 SCREENING FOR MALIGNANT NEOPLASM OF PROSTATE: ICD-10-CM

## 2021-09-23 DIAGNOSIS — R50.9 FEVER AND CHILLS: ICD-10-CM

## 2021-09-23 DIAGNOSIS — E83.52 HYPERCALCEMIA: ICD-10-CM

## 2021-09-23 DIAGNOSIS — E53.8 B12 DEFICIENCY: ICD-10-CM

## 2021-09-23 DIAGNOSIS — E21.3 HYPERPARATHYROIDISM (HCC): ICD-10-CM

## 2021-09-23 DIAGNOSIS — R53.82 CHRONIC FATIGUE AND MALAISE: ICD-10-CM

## 2021-09-23 DIAGNOSIS — E55.9 VITAMIN D DEFICIENCY: ICD-10-CM

## 2021-09-23 DIAGNOSIS — E61.1 IRON DEFICIENCY: ICD-10-CM

## 2021-09-23 DIAGNOSIS — R53.81 CHRONIC FATIGUE AND MALAISE: ICD-10-CM

## 2021-09-23 LAB
25(OH)D3 SERPL-MCNC: 39.4 NG/ML (ref 30–100)
ALBUMIN SERPL BCP-MCNC: 3.8 G/DL (ref 3.5–5)
ALP SERPL-CCNC: 78 U/L (ref 46–116)
ALT SERPL W P-5'-P-CCNC: 16 U/L (ref 12–78)
ANION GAP SERPL CALCULATED.3IONS-SCNC: 8 MMOL/L (ref 4–13)
AST SERPL W P-5'-P-CCNC: 19 U/L (ref 5–45)
BASOPHILS # BLD AUTO: 0.06 THOUSANDS/ΜL (ref 0–0.1)
BASOPHILS NFR BLD AUTO: 1 % (ref 0–1)
BILIRUB SERPL-MCNC: 0.84 MG/DL (ref 0.2–1)
BUN SERPL-MCNC: 24 MG/DL (ref 5–25)
CA-I BLD-SCNC: 1.3 MMOL/L (ref 1.12–1.32)
CALCIUM SERPL-MCNC: 10.3 MG/DL (ref 8.3–10.1)
CHLORIDE SERPL-SCNC: 105 MMOL/L (ref 100–108)
CO2 SERPL-SCNC: 26 MMOL/L (ref 21–32)
CREAT SERPL-MCNC: 1.19 MG/DL (ref 0.6–1.3)
EOSINOPHIL # BLD AUTO: 0.1 THOUSAND/ΜL (ref 0–0.61)
EOSINOPHIL NFR BLD AUTO: 1 % (ref 0–6)
ERYTHROCYTE [DISTWIDTH] IN BLOOD BY AUTOMATED COUNT: 13.9 % (ref 11.6–15.1)
FERRITIN SERPL-MCNC: 62 NG/ML (ref 8–388)
GFR SERPL CREATININE-BSD FRML MDRD: 55 ML/MIN/1.73SQ M
GLUCOSE P FAST SERPL-MCNC: 94 MG/DL (ref 65–99)
HCT VFR BLD AUTO: 45.2 % (ref 36.5–49.3)
HGB BLD-MCNC: 14.5 G/DL (ref 12–17)
IMM GRANULOCYTES # BLD AUTO: 0.03 THOUSAND/UL (ref 0–0.2)
IMM GRANULOCYTES NFR BLD AUTO: 0 % (ref 0–2)
IRON SERPL-MCNC: 123 UG/DL (ref 65–175)
LYMPHOCYTES # BLD AUTO: 1.06 THOUSANDS/ΜL (ref 0.6–4.47)
LYMPHOCYTES NFR BLD AUTO: 13 % (ref 14–44)
MCH RBC QN AUTO: 30.3 PG (ref 26.8–34.3)
MCHC RBC AUTO-ENTMCNC: 32.1 G/DL (ref 31.4–37.4)
MCV RBC AUTO: 95 FL (ref 82–98)
MONOCYTES # BLD AUTO: 0.49 THOUSAND/ΜL (ref 0.17–1.22)
MONOCYTES NFR BLD AUTO: 6 % (ref 4–12)
NEUTROPHILS # BLD AUTO: 6.38 THOUSANDS/ΜL (ref 1.85–7.62)
NEUTS SEG NFR BLD AUTO: 79 % (ref 43–75)
NRBC BLD AUTO-RTO: 0 /100 WBCS
PLATELET # BLD AUTO: 185 THOUSANDS/UL (ref 149–390)
PMV BLD AUTO: 8.7 FL (ref 8.9–12.7)
POTASSIUM SERPL-SCNC: 4.9 MMOL/L (ref 3.5–5.3)
PROT SERPL-MCNC: 7.2 G/DL (ref 6.4–8.2)
PSA SERPL-MCNC: 0.2 NG/ML (ref 0–4)
PTH-INTACT SERPL-MCNC: 113 PG/ML (ref 18.4–80.1)
RBC # BLD AUTO: 4.78 MILLION/UL (ref 3.88–5.62)
SODIUM SERPL-SCNC: 139 MMOL/L (ref 136–145)
TSH SERPL DL<=0.05 MIU/L-ACNC: 0.68 UIU/ML (ref 0.36–3.74)
VIT B12 SERPL-MCNC: 529 PG/ML (ref 100–900)
WBC # BLD AUTO: 8.12 THOUSAND/UL (ref 4.31–10.16)

## 2021-09-23 PROCEDURE — 87186 SC STD MICRODIL/AGAR DIL: CPT

## 2021-09-23 PROCEDURE — 87086 URINE CULTURE/COLONY COUNT: CPT

## 2021-09-23 PROCEDURE — 83970 ASSAY OF PARATHORMONE: CPT

## 2021-09-23 PROCEDURE — 80053 COMPREHEN METABOLIC PANEL: CPT

## 2021-09-23 PROCEDURE — 84443 ASSAY THYROID STIM HORMONE: CPT

## 2021-09-23 PROCEDURE — 87077 CULTURE AEROBIC IDENTIFY: CPT

## 2021-09-23 PROCEDURE — 71046 X-RAY EXAM CHEST 2 VIEWS: CPT

## 2021-09-23 PROCEDURE — 82306 VITAMIN D 25 HYDROXY: CPT

## 2021-09-23 PROCEDURE — 82607 VITAMIN B-12: CPT

## 2021-09-23 PROCEDURE — 85025 COMPLETE CBC W/AUTO DIFF WBC: CPT

## 2021-09-23 PROCEDURE — 82728 ASSAY OF FERRITIN: CPT

## 2021-09-23 PROCEDURE — 83540 ASSAY OF IRON: CPT

## 2021-09-23 PROCEDURE — G0103 PSA SCREENING: HCPCS

## 2021-09-23 PROCEDURE — 36415 COLL VENOUS BLD VENIPUNCTURE: CPT

## 2021-09-23 PROCEDURE — 82330 ASSAY OF CALCIUM: CPT

## 2021-09-27 LAB
BACTERIA UR CULT: ABNORMAL
BACTERIA UR CULT: ABNORMAL

## 2021-09-28 ENCOUNTER — TELEPHONE (OUTPATIENT)
Dept: ENDOCRINOLOGY | Facility: CLINIC | Age: 86
End: 2021-09-28

## 2021-09-28 NOTE — TELEPHONE ENCOUNTER
Calcium very mildly elevated , vitamin D ok- continue supplementations     He is at increased risk of fracture and should be on medications to decrease the risk- keep the f/u appointment to discuss further

## 2021-10-15 ENCOUNTER — TELEPHONE (OUTPATIENT)
Dept: OTHER | Facility: OTHER | Age: 86
End: 2021-10-15

## 2021-10-18 ENCOUNTER — TELEPHONE (OUTPATIENT)
Dept: OTHER | Facility: OTHER | Age: 86
End: 2021-10-18

## 2021-10-27 ENCOUNTER — TELEPHONE (OUTPATIENT)
Dept: ENDOCRINOLOGY | Facility: CLINIC | Age: 86
End: 2021-10-27

## 2021-10-27 ENCOUNTER — OFFICE VISIT (OUTPATIENT)
Dept: ENDOCRINOLOGY | Facility: CLINIC | Age: 86
End: 2021-10-27
Payer: COMMERCIAL

## 2021-10-27 VITALS
HEART RATE: 72 BPM | SYSTOLIC BLOOD PRESSURE: 136 MMHG | WEIGHT: 196.2 LBS | DIASTOLIC BLOOD PRESSURE: 86 MMHG | HEIGHT: 70 IN | BODY MASS INDEX: 28.09 KG/M2

## 2021-10-27 DIAGNOSIS — E83.52 HYPERCALCEMIA: ICD-10-CM

## 2021-10-27 DIAGNOSIS — E21.0 PRIMARY HYPERPARATHYROIDISM (HCC): ICD-10-CM

## 2021-10-27 DIAGNOSIS — M81.8 OTHER OSTEOPOROSIS WITHOUT CURRENT PATHOLOGICAL FRACTURE: Primary | ICD-10-CM

## 2021-10-27 PROCEDURE — 99214 OFFICE O/P EST MOD 30 MIN: CPT | Performed by: INTERNAL MEDICINE

## 2021-10-27 RX ORDER — ZOLEDRONIC ACID 5 MG/100ML
5 INJECTION, SOLUTION INTRAVENOUS ONCE
Status: CANCELLED | OUTPATIENT
Start: 2021-10-27

## 2021-10-27 RX ORDER — SODIUM CHLORIDE 9 MG/ML
20 INJECTION, SOLUTION INTRAVENOUS ONCE
Status: CANCELLED | OUTPATIENT
Start: 2021-10-27

## 2021-11-02 ENCOUNTER — HOSPITAL ENCOUNTER (OUTPATIENT)
Dept: INFUSION CENTER | Facility: CLINIC | Age: 86
Discharge: HOME | End: 2021-11-02
Payer: COMMERCIAL

## 2021-11-02 VITALS — SYSTOLIC BLOOD PRESSURE: 163 MMHG | HEART RATE: 70 BPM | TEMPERATURE: 98 F | DIASTOLIC BLOOD PRESSURE: 93 MMHG

## 2021-11-02 DIAGNOSIS — M81.8 OTHER OSTEOPOROSIS WITHOUT CURRENT PATHOLOGICAL FRACTURE: Primary | ICD-10-CM

## 2021-11-02 PROCEDURE — 96365 THER/PROPH/DIAG IV INF INIT: CPT

## 2021-11-02 RX ORDER — SODIUM CHLORIDE 9 MG/ML
20 INJECTION, SOLUTION INTRAVENOUS ONCE
OUTPATIENT
Start: 2022-11-02

## 2021-11-02 RX ORDER — SODIUM CHLORIDE 9 MG/ML
20 INJECTION, SOLUTION INTRAVENOUS ONCE
Status: COMPLETED | OUTPATIENT
Start: 2021-11-02 | End: 2021-11-02

## 2021-11-02 RX ORDER — ZOLEDRONIC ACID 5 MG/100ML
5 INJECTION, SOLUTION INTRAVENOUS ONCE
Status: COMPLETED | OUTPATIENT
Start: 2021-11-02 | End: 2021-11-02

## 2021-11-02 RX ORDER — ZOLEDRONIC ACID 5 MG/100ML
5 INJECTION, SOLUTION INTRAVENOUS ONCE
OUTPATIENT
Start: 2022-11-02

## 2021-11-02 RX ADMIN — ZOLEDRONIC ACID 5 MG: 5 INJECTION, SOLUTION INTRAVENOUS at 15:05

## 2021-11-02 RX ADMIN — SODIUM CHLORIDE 20 ML/HR: 0.9 INJECTION, SOLUTION INTRAVENOUS at 14:55

## 2021-11-08 ENCOUNTER — TELEPHONE (OUTPATIENT)
Dept: OTHER | Facility: OTHER | Age: 86
End: 2021-11-08

## 2021-12-01 ENCOUNTER — TELEPHONE (OUTPATIENT)
Dept: OTHER | Facility: OTHER | Age: 86
End: 2021-12-01

## 2022-02-14 ENCOUNTER — OFFICE VISIT (OUTPATIENT)
Dept: UROLOGY | Facility: MEDICAL CENTER | Age: 87
End: 2022-02-14
Payer: COMMERCIAL

## 2022-02-14 VITALS
BODY MASS INDEX: 26.83 KG/M2 | HEART RATE: 72 BPM | SYSTOLIC BLOOD PRESSURE: 130 MMHG | WEIGHT: 187.4 LBS | DIASTOLIC BLOOD PRESSURE: 88 MMHG | HEIGHT: 70 IN

## 2022-02-14 DIAGNOSIS — Z85.46 HISTORY OF PROSTATE CANCER: Primary | ICD-10-CM

## 2022-02-14 DIAGNOSIS — R33.9 URINARY RETENTION WITH INCOMPLETE BLADDER EMPTYING: ICD-10-CM

## 2022-02-14 DIAGNOSIS — N28.89 RENAL MASS: ICD-10-CM

## 2022-02-14 PROCEDURE — 99214 OFFICE O/P EST MOD 30 MIN: CPT | Performed by: UROLOGY

## 2022-02-14 RX ORDER — SERTRALINE HYDROCHLORIDE 25 MG/1
25 TABLET, FILM COATED ORAL DAILY
COMMUNITY
Start: 2021-12-18

## 2022-02-14 RX ORDER — SPIRONOLACTONE 25 MG/1
25 TABLET ORAL DAILY
COMMUNITY
Start: 2021-11-24

## 2022-02-14 RX ORDER — DILTIAZEM HYDROCHLORIDE 360 MG/1
CAPSULE, EXTENDED RELEASE ORAL
COMMUNITY
Start: 2022-02-14

## 2022-02-14 NOTE — PROGRESS NOTES
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  3  Radiation therapy for prostate cancer over 10 years ago, PSA always low, 0 2 recently    Prior info: Indwelling Jacobs, Botox done two months ago for persistent leakage around the catheter  The Botox help with leakage  However, having intermittent hematuria ever since the procedure  Urine is light maroon color, does not flush well, bladder has minimal capacity     1  Bleeding seen in from prostate fossa today  No active bleeding in the bladder anywhere  2  He has elevated bladder neck and posterior lip of prostate, and I have Required using a coude Jacobs to get in each time  3  His wife changes the catheter once per month with a regular, not coude, and she says she does not put it very far in  I suspect the balloon may be blown up in the prostate when she places the Jacobs, causing the bleeding  4  We will see how he does with the  coude in place now  May need repeat Botox in 6-12 months  ASSESSMENT / PLAN:    1  Small renal mass we can observe conservatively without need for surgery  2  Ultrasound one year just to observe the size  3  Wife changes the catheter every month without difficulty  4  Does not need PSAs anymore    The following portions of the patient's history were reviewed and updated as appropriate: allergies, current medications, past family history, past medical history, past social history, past surgical history and problem list     Review of Systems   All other systems reviewed and are negative  Objective:     Physical Exam  Constitutional:       General: He is not in acute distress  Appearance: He is well-developed  He is not diaphoretic  HENT:      Head: Normocephalic and atraumatic  Eyes:      General: No scleral icterus    Pulmonary:      Effort: Pulmonary effort is normal    Skin:     Coloration: Skin is not pale  Neurological:      Mental Status: He is alert and oriented to person, place, and time  Psychiatric:         Behavior: Behavior normal          Thought Content: Thought content normal          Judgment: Judgment normal            0   Lab Value Date/Time    PSA 0 2 09/23/2021 1227    PSA <0 1 10/03/2017 1026   ]  BUN   Date Value Ref Range Status   09/23/2021 24 5 - 25 mg/dL Final     Creatinine   Date Value Ref Range Status   09/23/2021 1 19 0 60 - 1 30 mg/dL Final     Comment:     Standardized to IDMS reference method     No components found for: CBC      Patient Active Problem List   Diagnosis    Ambulatory dysfunction    [facility]    [facility]    Chronic atrial fibrillation (HCC)    Chronic obstructive pulmonary disease [facility]    History of CVA (cerebrovascular accident)    History of GI bleed    Hypertension, essential    Impaired fasting glucose    Insomnia    Memory loss    Mixed hyperlipidemia    Osteoarthritis, generalized    Severe obstructive sleep apnea    Spina bifida occulta    Urinary incontinence    [facility] dementia without [facility] disturbance [facility]    Premature atrial contractions    Overactive bladder    History of prostate cancer    Acute cystitis without hematuria    Urinary retention with incomplete bladder emptying    Urge incontinence    Postprocedural anterior urethral stricture    Primary hyperparathyroidism [facility]    Other osteoporosis without current pathological fracture    Hypercalcemia        Diagnoses and all orders for this visit:    History of prostate cancer    Urinary retention with incomplete bladder emptying  -     US kidney and bladder; Future    Renal mass  -     US kidney and bladder; Future    Other orders  -     sertraline (ZOLOFT) 25 mg tablet; Take 25 mg by mouth daily  -     spironolactone (ALDACTONE) 25 mg tablet;  Take 25 mg by mouth daily (Patient not taking: Reported on 2/14/2022 )  -     traMADol-acetaminophen (ULTRACET) 37 5-325 mg per tablet; Take 1 tablet by mouth 2 (two) times a day  -     Tiadylt  MG 24 hr capsule           Patient ID: Stephie Gracia is a 80 y o  male        Current Outpatient Medications:     albuterol (PROVENTIL HFA,VENTOLIN HFA) 90 mcg/act inhaler, Inhale 2 puffs every 6 (six) hours as needed for wheezing, Disp: , Rfl:     apixaban (ELIQUIS) 5 mg, Take 5 mg by mouth 2 (two) times a day, Disp: , Rfl:     atenolol (TENORMIN) 25 mg tablet, Take 50 mg by mouth daily , Disp: , Rfl:     atorvastatin (LIPITOR) 40 mg tablet, Take 40 mg by mouth daily, Disp: , Rfl:     budesonide-formoterol (SYMBICORT) 160-4 5 mcg/act inhaler, 2 (two) times a day as needed , Disp: , Rfl:     donepezil (ARICEPT) 10 mg tablet, Take 10 mg by mouth daily, Disp: , Rfl:     furosemide (LASIX) 20 mg tablet, Take 20 mg by mouth daily, Disp: , Rfl:     lisinopril (ZESTRIL) 40 mg tablet, Take 40 mg by mouth daily, Disp: , Rfl:     mirtazapine (REMERON) 30 mg tablet, Take 30 mg by mouth daily at bedtime , Disp: , Rfl:     Multiple Vitamin (MULTIVITAMIN) capsule, Take 1 capsule by mouth daily, Disp: , Rfl:     sertraline (ZOLOFT) 25 mg tablet, Take 25 mg by mouth daily, Disp: , Rfl:     Tiadylt  MG 24 hr capsule, , Disp: , Rfl:     traMADol (ULTRAM) 50 mg tablet, Take 50 mg by mouth 2 (two) times a day as needed for moderate pain Pt reports taking 2x/day, Disp: , Rfl:     traMADol-acetaminophen (ULTRACET) 37 5-325 mg per tablet, Take 1 tablet by mouth 2 (two) times a day, Disp: , Rfl:     amoxicillin (AMOXIL) 500 MG tablet, Take 500 mg by mouth daily as needed Only before dental procedures (Patient not taking: Reported on 2/14/2022 ), Disp: , Rfl:     spironolactone (ALDACTONE) 25 mg tablet, Take 25 mg by mouth daily (Patient not taking: Reported on 2/14/2022 ), Disp: , Rfl:     Current Facility-Administered Medications:   onabotulinumtoxin A (BOTOX) 200 Units in sodium chloride (PF) 0 9 % 30 mL, 200 Units, Injection, Once, Eriberto Parnell MD    Past Medical History:   Diagnosis Date    [facility]     Cancer [facility])      Prostate cancer 13 yrs ago    COPD (chronic obstructive pulmonary disease) [facility]     CPAP (continuous positive airway pressure) dependence     [facility]A [facility]re[facility] accident) [facility]     possible but unsure when it was, was seen on MRI    Dental bridge present     lower    Does use hearing aid     History of pneumonia as a child     History of radiation therapy     Hyperlipidemia     Hypertension     Malignant [facility]     Osteoarthritis     Other neuromuscular dysfunction of bladder     Overactive bladder     PAC (premature atrial contraction)     Prostatitis     history of    Shortness of breath     Sleep apnea     Urge incontinence     Urinary retention     Wears glasses     Wears partial dentures     lower metal hardware       Past Surgical History:   Procedure Laterality Date    BOTOX INJECTION N/A 2/26/2019    Procedure: INJECTION BOTULINUM TOXIN (BOTOX), URETHRAL DILATION;  Surgeon: Eriberto Parnell MD;  [facility];  Service: Urology    BOTOX INJECTION N/A 2/18/2020    Procedure: INJ BOTULINUM TOXIN;  Surgeon: Eriberto Parnell MD;  [facility];  Service: Urology    CYSTOSCOPY N/A 2/18/2020    Procedure: Milton Romain;  Surgeon: Eriberto Parnell MD;  [facility];  Service: Urology    FRACTURE SURGERY      left femur    JOINT REPLACEMENT      shoulder left    OTHER SURGICAL HISTORY      pt reports implant nonfunctioning electrical device left lower back around hip    [place] OPEN TREATMENT BIMALLEOLAR ANKLE FRACTURE Right 12/29/2017    Procedure: Open Reduction Internal Fixation Bimalleolar fracture right lower leg using plates and screws;  Surgeon: Lan Osborne DPM;  [facility];  Service: Podiatry    PROSTATECTOMY      SHOULDER SURGERY         Social History

## 2022-04-27 ENCOUNTER — LAB (OUTPATIENT)
Dept: LAB | Facility: HOSPITAL | Age: 87
End: 2022-04-27
Attending: INTERNAL MEDICINE
Payer: COMMERCIAL

## 2022-04-27 DIAGNOSIS — M81.8 OTHER OSTEOPOROSIS WITHOUT CURRENT PATHOLOGICAL FRACTURE: ICD-10-CM

## 2022-04-27 DIAGNOSIS — E78.2 MIXED HYPERLIPIDEMIA: ICD-10-CM

## 2022-04-27 DIAGNOSIS — E21.0 PRIMARY HYPERPARATHYROIDISM (HCC): ICD-10-CM

## 2022-04-27 DIAGNOSIS — E83.52 HYPERCALCEMIA: ICD-10-CM

## 2022-04-27 DIAGNOSIS — R73.01 IMPAIRED FASTING GLUCOSE: ICD-10-CM

## 2022-04-27 LAB
25(OH)D3 SERPL-MCNC: 50.8 NG/ML (ref 30–100)
ALBUMIN SERPL BCP-MCNC: 4 G/DL (ref 3.5–5)
ALP SERPL-CCNC: 66 U/L (ref 46–116)
ALT SERPL W P-5'-P-CCNC: 10 U/L (ref 12–78)
ANION GAP SERPL CALCULATED.3IONS-SCNC: 7 MMOL/L (ref 4–13)
AST SERPL W P-5'-P-CCNC: 20 U/L (ref 5–45)
BILIRUB SERPL-MCNC: 0.58 MG/DL (ref 0.2–1)
BUN SERPL-MCNC: 37 MG/DL (ref 5–25)
CALCIUM SERPL-MCNC: 10.4 MG/DL (ref 8.3–10.1)
CHLORIDE SERPL-SCNC: 106 MMOL/L (ref 100–108)
CHOLEST SERPL-MCNC: 126 MG/DL
CO2 SERPL-SCNC: 28 MMOL/L (ref 21–32)
CREAT SERPL-MCNC: 1.71 MG/DL (ref 0.6–1.3)
EST. AVERAGE GLUCOSE BLD GHB EST-MCNC: 117 MG/DL
GFR SERPL CREATININE-BSD FRML MDRD: 35 ML/MIN/1.73SQ M
GLUCOSE SERPL-MCNC: 106 MG/DL (ref 65–140)
HBA1C MFR BLD: 5.7 %
HDLC SERPL-MCNC: 43 MG/DL
LDLC SERPL CALC-MCNC: 69 MG/DL (ref 0–100)
NONHDLC SERPL-MCNC: 83 MG/DL
PHOSPHATE SERPL-MCNC: 3.1 MG/DL (ref 2.3–4.1)
POTASSIUM SERPL-SCNC: 4.1 MMOL/L (ref 3.5–5.3)
PROT SERPL-MCNC: 7.4 G/DL (ref 6.4–8.2)
PTH-INTACT SERPL-MCNC: 122 PG/ML (ref 18.4–80.1)
SODIUM SERPL-SCNC: 141 MMOL/L (ref 136–145)
T4 FREE SERPL-MCNC: 0.94 NG/DL (ref 0.76–1.46)
TRIGL SERPL-MCNC: 71 MG/DL
TSH SERPL DL<=0.05 MIU/L-ACNC: 0.38 UIU/ML (ref 0.45–4.5)

## 2022-04-27 PROCEDURE — 83970 ASSAY OF PARATHORMONE: CPT

## 2022-04-27 PROCEDURE — 84443 ASSAY THYROID STIM HORMONE: CPT

## 2022-04-27 PROCEDURE — 80053 COMPREHEN METABOLIC PANEL: CPT

## 2022-04-27 PROCEDURE — 84100 ASSAY OF PHOSPHORUS: CPT

## 2022-04-27 PROCEDURE — 82306 VITAMIN D 25 HYDROXY: CPT

## 2022-04-27 PROCEDURE — 80061 LIPID PANEL: CPT

## 2022-04-27 PROCEDURE — 84439 ASSAY OF FREE THYROXINE: CPT

## 2022-04-27 PROCEDURE — 83036 HEMOGLOBIN GLYCOSYLATED A1C: CPT

## 2022-04-27 PROCEDURE — 36415 COLL VENOUS BLD VENIPUNCTURE: CPT

## 2022-04-28 NOTE — RESULT ENCOUNTER NOTE
Please call the patient regarding labs - calcium mildly elevated but stable , continue dietary calcium , hydration and vitamin d supplementations

## 2022-05-03 NOTE — PROGRESS NOTES
HISTORY:    Chronic Jacobs, which wife changes once per month  Starting to have spasms, urgency, leakage around Jacobs  Good results when Botox done  10 months ago  Small amount of prostate cancer on TURP years ago, no active treatment given or planned         ASSESSMENT / PLAN:    Schedule Botox  Potential complications discussed  Pre treat with antibiotics due to chronic colonization with indwelling Jacobs    The following portions of the patient's history were reviewed and updated as appropriate: allergies, current medications, past family history, past medical history, past social history, past surgical history and problem list     Review of Systems   All other systems reviewed and are negative  Objective:     Physical Exam   Constitutional: He appears well-developed and well-nourished     Genitourinary:   Genitourinary Comments: Jacobs in place, minimal inflammation         0   Lab Value Date/Time    PSA <0 1 10/03/2017 1026   ]  BUN   Date Value Ref Range Status   02/14/2019 21 5 - 25 mg/dL Final     Creatinine   Date Value Ref Range Status   02/14/2019 1 09 0 60 - 1 30 mg/dL Final     Comment:     Standardized to IDMS reference method     No components found for: CBC      Patient Active Problem List   Diagnosis    Ambulatory dysfunction    [facility]    Alcohol dependence in remission ([facility]    Chronic atrial fibrillation    Chronic obstructive pulmonary disease ([facility]    History of CVA (cerebrovascular accident)    History of GI bleed    Hypertension, essential    Impaired fasting glucose    Insomnia    Memory loss    Mixed hyperlipidemia    Osteoarthritis, generalized    Severe obstructive sleep apnea    Spina bifida occulta    Urinary incontinence    Vascular dementia without behavioral disturbance (HCC)    Premature atrial contractions    Overactive bladder    History of prostate cancer    Acute cystitis without hematuria    Urinary retention with incomplete bladder emptying    Urge incontinence    Postprocedural anterior urethral stricture        Diagnoses and all orders for this visit:    Overactive bladder  -     cephalexin (KEFLEX) 500 mg capsule; Take 1 capsule (500 mg total) by mouth every 8 (eight) hours for 5 days Start 3 days before procedure  -     Case request operating room: INJECTION BOTULINUM TOXIN (BOTOX), cystoscopy; Standing  -     Case request operating room: INJECTION BOTULINUM TOXIN (BOTOX), cystoscopy    Adenocarcinoma of prostate ([facility] )    Other orders  -     apixaban (ELIQUIS) 5 mg; Take 5 mg by mouth 2 (two) times a day  -     Diet NPO; Sips with meds; Standing  -     Place sequential compression device; Standing           Patient ID: Kayy Jean is a 80 y o  male  Current Outpatient Medications:     albuterol (PROVENTIL HFA,VENTOLIN HFA) 90 mcg/act inhaler, Inhale 2 puffs every 6 (six) hours as needed for wheezing, Disp: , Rfl:     amoxicillin (AMOXIL) 500 MG tablet, Take 500 mg by mouth daily as needed Only before dental procedures, Disp: , Rfl:     apixaban (ELIQUIS) 5 mg, Take 5 mg by mouth 2 (two) times a day, Disp: , Rfl:     atenolol (TENORMIN) 25 mg tablet, Take 50 mg by mouth daily , Disp: , Rfl:     atorvastatin (LIPITOR) 40 mg tablet, Take 40 mg by mouth daily, Disp: , Rfl:     budesonide-formoterol (SYMBICORT) 160-4 5 mcg/act inhaler, INHALE 2 PUFFS TWICE DAILY   RINSE AND SPIT MOUTH AFTER USE, Disp: , Rfl:     diltiazem (CARDIZEM) 120 MG tablet, Take 120 mg by mouth daily, Disp: , Rfl:     donepezil (ARICEPT) 10 mg tablet, Take 10 mg by mouth daily, Disp: , Rfl:     furosemide (LASIX) 20 mg tablet, Take 20 mg by mouth daily, Disp: , Rfl:     lisinopril (ZESTRIL) 40 mg tablet, Take 40 mg by mouth daily, Disp: , Rfl:     mirtazapine (REMERON) 30 mg tablet, Take 30 mg by mouth daily at bedtime , Disp: , Rfl:     Multiple Vitamin (MULTIVITAMIN) capsule, Take 1 capsule by mouth daily, Disp: , Rfl:    potassium chloride (MICRO-K) 10 MEQ CR capsule, Take 10 mEq by mouth 2 (two) times a day, Disp: , Rfl:     traMADol (ULTRAM) 50 mg tablet, Take 50 mg by mouth 2 (two) times a day as needed for moderate pain, Disp: , Rfl:     cephalexin (KEFLEX) 500 mg capsule, Take 1 capsule (500 mg total) by mouth every 8 (eight) hours for 5 days Start 3 days before procedure, Disp: 15 capsule, Rfl: 0    dabigatran etexilate (PRADAXA) 150 mg capsu, Take 150 mg by mouth 2 (two) times a day, Disp: , Rfl:     Past Medical History:   Diagnosis Date    [facility]     Atrial fibrillation (HCC)     [facility]     CPAP (continuous positive airway pressure) dependence     CVA (cerebral vascular accident) [facility]     possible but unsure when it was, was seen on MRI    Hyperlipidemia     Hypertension     Malignant ne[facility] of prostate [facility]     Other neuromuscular dysfunction of bladder     Overactive bladder     PAC (premature atrial contraction)     Prostate cancer (HCC)     Prostatitis     RA (rheumatoid arthritis) ([facility]     Rheumatoid arthritis ([facility]     Sleep apnea     Urge incontinence     Urinary retention     Wears partial dentures     lower metal hardware       Past Surgical History:   Procedure Laterality Date    BOTOX INJECTION N/A 2/26/2019    Procedure: INJECTION BOTULINUM TOXIN (BOTOX), URETHRAL DILATION;  Surgeon: Clair Perkins MD;  [facility];  Service: Urology    FRACTURE SURGERY      left femur    JOINT REPLACEMENT      shoulder left    [place] OPEN TREATMENT BIMALLEOLAR ANKLE FRACTURE Right 12/29/2017    Procedure: Open Reduction Internal Fixation Bimalleolar fracture right lower leg using plates and screws;  Surgeon: Abdifatah Cat DPM;  [facility];  Service: Podiatry    [facility]     [facility]         Social History 87

## 2022-05-11 ENCOUNTER — HOSPITAL ENCOUNTER (OUTPATIENT)
Dept: ULTRASOUND IMAGING | Facility: HOSPITAL | Age: 87
Discharge: HOME | End: 2022-05-11
Payer: COMMERCIAL

## 2022-05-11 DIAGNOSIS — R33.9 URINARY RETENTION WITH INCOMPLETE BLADDER EMPTYING: ICD-10-CM

## 2022-05-11 DIAGNOSIS — N28.89 RENAL MASS: ICD-10-CM

## 2022-05-11 PROCEDURE — 76770 US EXAM ABDO BACK WALL COMP: CPT

## 2022-05-12 ENCOUNTER — TELEPHONE (OUTPATIENT)
Dept: UROLOGY | Facility: AMBULATORY SURGERY CENTER | Age: 87
End: 2022-05-12

## 2022-05-12 ENCOUNTER — TELEPHONE (OUTPATIENT)
Dept: UROLOGY | Facility: MEDICAL CENTER | Age: 87
End: 2022-05-12

## 2022-05-12 NOTE — TELEPHONE ENCOUNTER
----- Message from Jesse Hogue MD sent at 5/12/2022  2:14 PM EDT -----  Tell patient ultrasound shows the mass in the right kidney is the same as before, continue observation

## 2022-05-12 NOTE — TELEPHONE ENCOUNTER
Spoke to pt's wife and advised of Dr Akiko Bautista note below re [address] from 5/11/22:    Message from Kayleigh Alexandre MD sent at 5/12/2022  2:14 PM EDT -----  Tell patient ultrasound shows the mass in the right kidney is the same as before, continue observation

## 2022-05-12 NOTE — TELEPHONE ENCOUNTER
Radiology Test Results - Radiology Calling with report update    Pt under care of: Dr Ronnie Rodriguez Findings - Please review

## 2022-05-13 ENCOUNTER — TELEPHONE (OUTPATIENT)
Dept: UROLOGY | Facility: MEDICAL CENTER | Age: 87
End: 2022-05-13

## 2022-05-13 NOTE — TELEPHONE ENCOUNTER
I discussed results of ultrasound with patient's wife  Radiologist said they could not really image the lesion very well, and could not tell cystic versus solid  They recommended repeat MRI for better evaluation  The prior MRI showed a fairly stable less than 3 cm solid mass  I again discussed on telephone, and the patient says he would never want surgery for this  So I do not think we need imaging now, and family agrees  We will see him back in one year discuss further imaging plans

## 2022-05-31 ENCOUNTER — LAB (OUTPATIENT)
Dept: LAB | Facility: HOSPITAL | Age: 87
End: 2022-05-31
Payer: COMMERCIAL

## 2022-05-31 DIAGNOSIS — R79.89 ELEVATED SERUM CREATININE: ICD-10-CM

## 2022-05-31 DIAGNOSIS — M81.8 OTHER OSTEOPOROSIS WITHOUT CURRENT PATHOLOGICAL FRACTURE: Primary | ICD-10-CM

## 2022-05-31 LAB
ALBUMIN SERPL BCP-MCNC: 3.8 G/DL (ref 3.5–5)
ALP SERPL-CCNC: 64 U/L (ref 46–116)
ALT SERPL W P-5'-P-CCNC: 14 U/L (ref 12–78)
ANION GAP SERPL CALCULATED.3IONS-SCNC: 8 MMOL/L (ref 4–13)
AST SERPL W P-5'-P-CCNC: 15 U/L (ref 5–45)
BILIRUB SERPL-MCNC: 0.67 MG/DL (ref 0.2–1)
BUN SERPL-MCNC: 42 MG/DL (ref 5–25)
CALCIUM SERPL-MCNC: 10.2 MG/DL (ref 8.3–10.1)
CHLORIDE SERPL-SCNC: 108 MMOL/L (ref 100–108)
CO2 SERPL-SCNC: 27 MMOL/L (ref 21–32)
CREAT SERPL-MCNC: 1.48 MG/DL (ref 0.6–1.3)
GFR SERPL CREATININE-BSD FRML MDRD: 41 ML/MIN/1.73SQ M
GLUCOSE SERPL-MCNC: 110 MG/DL (ref 65–140)
POTASSIUM SERPL-SCNC: 4.7 MMOL/L (ref 3.5–5.3)
PROT SERPL-MCNC: 7 G/DL (ref 6.4–8.2)
SODIUM SERPL-SCNC: 143 MMOL/L (ref 136–145)

## 2022-05-31 PROCEDURE — 36415 COLL VENOUS BLD VENIPUNCTURE: CPT

## 2022-05-31 PROCEDURE — 80053 COMPREHEN METABOLIC PANEL: CPT

## 2022-06-01 NOTE — RESULT ENCOUNTER NOTE
Please call the patient regarding labs - calcium slightly elevated but stable , renal function a little better -keep well hydrated

## 2022-10-19 ENCOUNTER — LAB (OUTPATIENT)
Dept: LAB | Facility: HOSPITAL | Age: 87
End: 2022-10-19
Payer: COMMERCIAL

## 2022-10-19 DIAGNOSIS — F03.A0 MILD DEMENTIA, UNSPECIFIED DEMENTIA TYPE, UNSPECIFIED WHETHER BEHAVIORAL, PSYCHOTIC, OR MOOD DISTURBANCE OR ANXIETY: ICD-10-CM

## 2022-10-19 DIAGNOSIS — M81.8 OTHER OSTEOPOROSIS WITHOUT CURRENT PATHOLOGICAL FRACTURE: ICD-10-CM

## 2022-10-19 LAB
ALBUMIN SERPL BCP-MCNC: 3.7 G/DL (ref 3.5–5)
ALP SERPL-CCNC: 94 U/L (ref 46–116)
ALT SERPL W P-5'-P-CCNC: 12 U/L (ref 12–78)
ANION GAP SERPL CALCULATED.3IONS-SCNC: 6 MMOL/L (ref 4–13)
AST SERPL W P-5'-P-CCNC: 14 U/L (ref 5–45)
BILIRUB SERPL-MCNC: 0.83 MG/DL (ref 0.2–1)
BUN SERPL-MCNC: 25 MG/DL (ref 5–25)
CALCIUM SERPL-MCNC: 10.8 MG/DL (ref 8.3–10.1)
CHLORIDE SERPL-SCNC: 106 MMOL/L (ref 96–108)
CO2 SERPL-SCNC: 31 MMOL/L (ref 21–32)
CREAT SERPL-MCNC: 1.45 MG/DL (ref 0.6–1.3)
GFR SERPL CREATININE-BSD FRML MDRD: 42 ML/MIN/1.73SQ M
GLUCOSE SERPL-MCNC: 104 MG/DL (ref 65–140)
POTASSIUM SERPL-SCNC: 4.6 MMOL/L (ref 3.5–5.3)
PROT SERPL-MCNC: 7.5 G/DL (ref 6.4–8.4)
SODIUM SERPL-SCNC: 143 MMOL/L (ref 135–147)
T4 FREE SERPL-MCNC: 1.47 NG/DL (ref 0.76–1.46)
TSH SERPL DL<=0.05 MIU/L-ACNC: 0.37 UIU/ML (ref 0.45–4.5)
VIT B12 SERPL-MCNC: 607 PG/ML (ref 100–900)

## 2022-10-19 PROCEDURE — 84439 ASSAY OF FREE THYROXINE: CPT

## 2022-10-19 PROCEDURE — 82607 VITAMIN B-12: CPT

## 2022-10-19 PROCEDURE — 84443 ASSAY THYROID STIM HORMONE: CPT

## 2022-10-19 PROCEDURE — 36415 COLL VENOUS BLD VENIPUNCTURE: CPT

## 2022-10-19 PROCEDURE — 80053 COMPREHEN METABOLIC PANEL: CPT

## (undated) DEVICE — ASTOUND IMPERVIOUS SURGICAL GOWN: Brand: CONVERTORS

## (undated) DEVICE — BONEE® NEEDLE FOR BLADDER INJECTION CH FR 05, 22G, 35 CM, BOX OF 1: Brand: PORGES COLOPLAST

## (undated) DEVICE — SUT ETHILON 3-0 PS-1 18 IN 1663G

## (undated) DEVICE — COBAN 4 IN STERILE

## (undated) DEVICE — ARTHREX DRILL BIT 2.5MM

## (undated) DEVICE — GLOVE INDICATOR PI UNDERGLOVE SZ 7 BLUE

## (undated) DEVICE — BAG URINE DRAINAGE 2000ML ANTI RFLX LF

## (undated) DEVICE — PACK TUR

## (undated) DEVICE — INVIEW CLEAR LEGGINGS: Brand: CONVERTORS

## (undated) DEVICE — 10FR FRAZIER SUCTION HANDLE: Brand: CARDINAL HEALTH

## (undated) DEVICE — INTENDED FOR TISSUE SEPARATION, AND OTHER PROCEDURES THAT REQUIRE A SHARP SURGICAL BLADE TO PUNCTURE OR CUT.: Brand: BARD-PARKER ® CARBON RIB-BACK BLADES

## (undated) DEVICE — ACE WRAP 4 IN UNSTERILE

## (undated) DEVICE — GLOVE INDICATOR PI UNDERGLOVE SZ 7.5 BLUE

## (undated) DEVICE — TAPE CAST 4IN FIBERGLASS 4YD WHITE

## (undated) DEVICE — ACE WRAP 6 IN UNSTERILE

## (undated) DEVICE — CUFF TOURNIQUET 30 X 4 IN QUICK CONNECT DISP 1BLA

## (undated) DEVICE — PREMIUM DRY TRAY LF: Brand: MEDLINE INDUSTRIES, INC.

## (undated) DEVICE — BAG URINE DRAINAGE LEG

## (undated) DEVICE — DRAPE C-ARM X-RAY

## (undated) DEVICE — GLOVE SRG BIOGEL 7.5

## (undated) DEVICE — OCCLUSIVE GAUZE STRIP,3% BISMUTH TRIBROMOPHENATE IN PETROLATUM BLEND: Brand: XEROFORM

## (undated) DEVICE — SUT VICRYL 3-0 SH 27 IN J416H

## (undated) DEVICE — TUBING SUCTION 5MM X 12 FT

## (undated) DEVICE — SCD SEQUENTIAL COMPRESSION COMFORT SLEEVE MEDIUM KNEE LENGTH: Brand: KENDALL SCD

## (undated) DEVICE — CATH FOLEY 18FR 5ML 2 WAY SILICONE ELASTIMER

## (undated) DEVICE — K-WIRE 1.3 X 130MM TROCAR TIP

## (undated) DEVICE — ASTOUND STANDARD SURGICAL GOWN, XL: Brand: CONVERTORS

## (undated) DEVICE — SUT MONOCRYL 3-0 SH 27 IN Y416H

## (undated) DEVICE — UNIVERSAL  MINOR EXTREMITY PK: Brand: CARDINAL HEALTH

## (undated) DEVICE — ANCHOR SUT TAK-BB THRD

## (undated) DEVICE — CHLORAPREP HI-LITE 26ML ORANGE

## (undated) DEVICE — PAD CAST 6 IN COTTON NON STERILE

## (undated) DEVICE — TRANSPOSAL ULTRAFLEX DUO/QUAD ULTRA CART MANIFOLD

## (undated) DEVICE — UROCATCH BAG

## (undated) DEVICE — SPECIMEN CONTAINER STERILE PEEL PACK

## (undated) DEVICE — DRILL BIT 2.6 X 160MM CANN

## (undated) DEVICE — LUBRICANT SURGILUBE TUBE 4 OZ  FLIP TOP

## (undated) DEVICE — DRILL BIT 2.5 MM CALIBRATED

## (undated) DEVICE — 2000CC GUARDIAN II: Brand: GUARDIAN

## (undated) DEVICE — KERLIX BANDAGE ROLL: Brand: KERLIX

## (undated) DEVICE — GLOVE SRG BIOGEL 7

## (undated) DEVICE — PAD CAST 4 IN COTTON NON STERILE

## (undated) DEVICE — GAUZE SPONGES,16 PLY: Brand: CURITY

## (undated) DEVICE — CATH FOLEY 16FR 5ML 2WAY LUBRICATH

## (undated) DEVICE — GLOVE INDICATOR PI UNDERGLOVE SZ 8 BLUE

## (undated) DEVICE — CATH FOLEY COUDE 16FR 5ML 2 WAY TIEMANN LUBRICATH

## (undated) DEVICE — EXIDINE 4 PCT

## (undated) DEVICE — SUT VICRYL 2-0 CT-2 27 IN J269H

## (undated) DEVICE — CATH FOLEY COUDE 18FR 5ML 2 WAY TIEMANN LUBRICATH

## (undated) DEVICE — DRILL BIT 3.5MM